# Patient Record
Sex: FEMALE | URBAN - METROPOLITAN AREA
[De-identification: names, ages, dates, MRNs, and addresses within clinical notes are randomized per-mention and may not be internally consistent; named-entity substitution may affect disease eponyms.]

---

## 2021-12-15 ENCOUNTER — APPOINTMENT (RX ONLY)
Dept: URBAN - METROPOLITAN AREA CLINIC 374 | Facility: CLINIC | Age: 46
Setting detail: DERMATOLOGY
End: 2021-12-15

## 2021-12-15 DIAGNOSIS — L81.1 CHLOASMA: ICD-10-CM | Status: WORSENING

## 2021-12-15 DIAGNOSIS — Z71.89 OTHER SPECIFIED COUNSELING: ICD-10-CM

## 2021-12-15 DIAGNOSIS — L81.4 OTHER MELANIN HYPERPIGMENTATION: ICD-10-CM

## 2021-12-15 PROCEDURE — ? PRESCRIPTION MEDICATION MANAGEMENT

## 2021-12-15 PROCEDURE — ? DIAGNOSIS COMMENT

## 2021-12-15 PROCEDURE — ? ADDITIONAL NOTES

## 2021-12-15 PROCEDURE — ? COUNSELING

## 2021-12-15 PROCEDURE — ? PHOTO-DOCUMENTATION

## 2021-12-15 PROCEDURE — ? RECOMMENDATIONS

## 2021-12-15 PROCEDURE — ? IN-HOUSE DISPENSING PHARMACY

## 2021-12-15 PROCEDURE — ? SUNSCREEN RECOMMENDATIONS

## 2021-12-15 PROCEDURE — 99203 OFFICE O/P NEW LOW 30 MIN: CPT

## 2021-12-15 ASSESSMENT — LOCATION SIMPLE DESCRIPTION DERM
LOCATION SIMPLE: RIGHT CHEEK
LOCATION SIMPLE: LEFT LIP
LOCATION SIMPLE: RIGHT LIP
LOCATION SIMPLE: LEFT CHEEK

## 2021-12-15 ASSESSMENT — LOCATION ZONE DERM
LOCATION ZONE: FACE
LOCATION ZONE: LIP

## 2021-12-15 ASSESSMENT — LOCATION DETAILED DESCRIPTION DERM
LOCATION DETAILED: RIGHT INFERIOR CENTRAL MALAR CHEEK
LOCATION DETAILED: LEFT INFERIOR CENTRAL MALAR CHEEK
LOCATION DETAILED: RIGHT UPPER CUTANEOUS LIP
LOCATION DETAILED: LEFT UPPER CUTANEOUS LIP

## 2021-12-15 NOTE — PROCEDURE: PRESCRIPTION MEDICATION MANAGEMENT
Detail Level: Zone
Render In Strict Bullet Format?: No
Initiate Treatment: Tretinoin 0.025% topical cream: Apply thin layer to face QHS
Initiate Treatment: Noni Tretinoin 0.025% topical cream: Apply thin layer to face QHS

## 2021-12-15 NOTE — PROCEDURE: ADDITIONAL NOTES
Additional Notes: Patient consent was obtained to proceed with the visit and recommended plan of care after discussion of all risks and benefits, including the risks of COVID-19 exposure.
Detail Level: Zone
Render Risk Assessment In Note?: yes

## 2021-12-15 NOTE — PROCEDURE: IN-HOUSE DISPENSING PHARMACY
Product 49 Unit Type: mg
Product 17 Unit Type: kit(s)
Name Of Product 4: Ultra Gentle Chemical Free SPF 30
Product 27 Refills: 0
Product 15 Refills: 3
Product 15 Amount/Unit (Numbers Only): 1
Product 8 Unit Type: bottle(s)
Product 13 Application Directions: Use to wash hair qday
Product 11 Price/Unit (In Dollars): 105
Product 4 Application Directions: apply thin layer to face qam and every 2 hours during the day
Name Of Product 9: 10/2 Therapeutic Spray
Product 2 Price/Unit (In Dollars): 46
Product 2 Refills: 2
Product 16 Price/Unit (In Dollars): 45
Product 9 Application Directions: spray thin layer on back qday
Name Of Product 14: Hyaluronic acid eye serum
Product 7 Price/Unit (In Dollars): 36.75
Name Of Product 5: R Essentials KP Kit
Product 7 Application Directions: apply thin layer to face qam
Name Of Product 12: 10/2 Therapeutic pads
Product 5 Price/Unit (In Dollars): 55
Product 16 Application Directions: apply scattered drops to scalp qam
Name Of Product 3: Noni tretinoin 0.05% topical cream
Product 7 Unit Type: tube(s)
Product 12 Application Directions: wipe face qam
Name Of Product 17: Brightening pads
Product 10 Price/Unit (In Dollars): 85
Product 1 Application Directions: apply thin layer to face qhs
Product 1 Unit Type: grams
Name Of Product 8: Biocorneum scar gel
Product 12 Unit Type: jar(s)
Product 15 Price/Unit (In Dollars): 39
Product 8 Application Directions: apply thin layer to scar BID
Name Of Product 13: DensiFi Shampoo and Conditioner
Product 6 Price/Unit (In Dollars): 30
Product 17 Application Directions: wipe aa of face qday
Detail Level: Zone
Product 13 Price/Unit (In Dollars): 56
Send Charges To Patient Encounter: Yes
Product 6 Application Directions: wipe face qam after washing
Name Of Product 11: retinol eye cream
Product 15 Application Directions: Wash aa of body qday in shower
Name Of Product 2: proscriptix pills
Product 11 Application Directions: apply thin layer to periorbital region qhs
Name Of Product 16: X.Cyte stimulating hair serum
Name Of Product 7: R Essentials sunscreen SPF50
Product 2 Application Directions: take 1 tab po TID
Product 14 Humphries/Unit (In Dollars): 51
Name Of Product 1: Noni tretinoin 0.025% topical cream
Product 1 Price/Unit (In Dollars): 84
Product 14 Application Directions: apply thin layer to under eye area qday
Product 5 Application Directions: wash face and neck qday with cleanser then apply thin layer of lotion
Name Of Product 10: Proscriptix shampoo, conditioner and pills
Product 3 Price/Unit (In Dollars): 70
Product 1 Amount/Unit (Numbers Only): 20
Product 17 Price/Unit (In Dollars): 78
Product 10 Application Directions: Wash hair with shampoo and conditioner each wash; take 1 pill po TID
Name Of Product 15: AHA 10% cleanser
Product 8 Price/Unit (In Dollars): 29.95
Name Of Product 6: 5/2 Therapeutic pads

## 2021-12-15 NOTE — PROCEDURE: RECOMMENDATIONS
Render Risk Assessment In Note?: no
Recommendations (Free Text): consult with Miguelina MANLEY
Detail Level: Simple
Recommendation Preamble: The following recommendations were made during the visit:

## 2021-12-28 ENCOUNTER — APPOINTMENT (RX ONLY)
Dept: URBAN - METROPOLITAN AREA CLINIC 374 | Facility: CLINIC | Age: 46
Setting detail: DERMATOLOGY
End: 2021-12-28

## 2021-12-28 DIAGNOSIS — Z41.9 ENCOUNTER FOR PROCEDURE FOR PURPOSES OTHER THAN REMEDYING HEALTH STATE, UNSPECIFIED: ICD-10-CM

## 2021-12-28 PROCEDURE — ? VI PEEL

## 2021-12-28 NOTE — PROCEDURE: VI PEEL
Price (Use Numbers Only, No Special Characters Or $): 782 Price (Use Numbers Only, No Special Characters Or $): 195

## 2022-01-27 ENCOUNTER — APPOINTMENT (RX ONLY)
Dept: URBAN - METROPOLITAN AREA CLINIC 374 | Facility: CLINIC | Age: 47
Setting detail: DERMATOLOGY
End: 2022-01-27

## 2022-01-27 DIAGNOSIS — Z41.9 ENCOUNTER FOR PROCEDURE FOR PURPOSES OTHER THAN REMEDYING HEALTH STATE, UNSPECIFIED: ICD-10-CM

## 2022-01-27 PROCEDURE — ? VI PEEL

## 2022-01-27 NOTE — PROCEDURE: VI PEEL
Price (Use Numbers Only, No Special Characters Or $): 699 Price (Use Numbers Only, No Special Characters Or $): 578

## 2022-02-24 ENCOUNTER — APPOINTMENT (RX ONLY)
Dept: URBAN - METROPOLITAN AREA CLINIC 374 | Facility: CLINIC | Age: 47
Setting detail: DERMATOLOGY
End: 2022-02-24

## 2022-02-24 DIAGNOSIS — Z41.9 ENCOUNTER FOR PROCEDURE FOR PURPOSES OTHER THAN REMEDYING HEALTH STATE, UNSPECIFIED: ICD-10-CM

## 2022-02-24 DIAGNOSIS — Z02.9 ENCOUNTER FOR ADMINISTRATIVE EXAMINATIONS, UNSPECIFIED: ICD-10-CM

## 2022-02-24 PROCEDURE — ? MICRONEEDLING

## 2022-02-24 NOTE — PROCEDURE: MICRONEEDLING
Depth In Mm: 0.1
Detail Level: Zone
Post-Care Instructions: After the procedure, take precautions agains sun exposure. Do not apply sunscreen for 12 hours after the procedure. Do not apply make-up for 12 hours after the procedure. Avoid alcohol based toners for 10-14 days. After 2-3 days patients can return to their regular skin regimen.
Depth In Mm: 1
Depth In Mm: 1.25
Consent: Written consent obtained, risks reviewed including but not limited to pain, scarring, infection and incomplete improvement.  Patient understands the procedure is cosmetic in nature and will require out of pocket payment.
Length Of Topical Anesthesia Application (Optional): 20 minutes
Price (Use Numbers Only, No Special Characters Or $): 379
Topical Anesthesia?: BLT cream (benzocaine 10%, lidocaine 4%, tetracaine 2%)
Depth In Mm: 0.5

## 2022-03-16 ENCOUNTER — APPOINTMENT (RX ONLY)
Dept: URBAN - METROPOLITAN AREA CLINIC 374 | Facility: CLINIC | Age: 47
Setting detail: DERMATOLOGY
End: 2022-03-16

## 2022-03-16 DIAGNOSIS — L81.4 OTHER MELANIN HYPERPIGMENTATION: ICD-10-CM | Status: STABLE

## 2022-03-16 DIAGNOSIS — L81.1 CHLOASMA: ICD-10-CM | Status: IMPROVED

## 2022-03-16 PROCEDURE — ? DEFER

## 2022-03-16 PROCEDURE — ? RECOMMENDATIONS

## 2022-03-16 PROCEDURE — ? PRESCRIPTION MEDICATION MANAGEMENT

## 2022-03-16 PROCEDURE — ? ADDITIONAL NOTES

## 2022-03-16 PROCEDURE — 99213 OFFICE O/P EST LOW 20 MIN: CPT

## 2022-03-16 ASSESSMENT — LOCATION ZONE DERM
LOCATION ZONE: FACE
LOCATION ZONE: LIP

## 2022-03-16 ASSESSMENT — LOCATION SIMPLE DESCRIPTION DERM
LOCATION SIMPLE: LEFT CHEEK
LOCATION SIMPLE: RIGHT CHEEK
LOCATION SIMPLE: RIGHT LIP

## 2022-03-16 ASSESSMENT — LOCATION DETAILED DESCRIPTION DERM
LOCATION DETAILED: RIGHT UPPER CUTANEOUS LIP
LOCATION DETAILED: LEFT INFERIOR CENTRAL MALAR CHEEK
LOCATION DETAILED: RIGHT INFERIOR CENTRAL MALAR CHEEK

## 2022-03-16 NOTE — PROCEDURE: RECOMMENDATIONS
Recommendations (Free Text): Continue visits with Miguelina [facility] .
Render Risk Assessment In Note?: no
Detail Level: Zone
Recommendation Preamble: The following recommendations were made during the visit:

## 2022-03-16 NOTE — PROCEDURE: DEFER
Procedure To Be Performed At Next Visit: Cryotherapy
Detail Level: Zone
Instructions (Optional): Continue treatments with Miguelina [facility] .
Introduction Text (Please End With A Colon): The following procedure was deferred:

## 2022-03-16 NOTE — PROCEDURE: PRESCRIPTION MEDICATION MANAGEMENT
Detail Level: Zone
Render In Strict Bullet Format?: No
Discontinue Regimen: Tretinoin
Discontinue Regimen: Noni Tretinoin 0.025% topical cream: Apply thin layer to face QHS

## 2022-03-24 ENCOUNTER — APPOINTMENT (RX ONLY)
Dept: URBAN - METROPOLITAN AREA CLINIC 374 | Facility: CLINIC | Age: 47
Setting detail: DERMATOLOGY
End: 2022-03-24

## 2022-03-24 DIAGNOSIS — Z41.9 ENCOUNTER FOR PROCEDURE FOR PURPOSES OTHER THAN REMEDYING HEALTH STATE, UNSPECIFIED: ICD-10-CM

## 2022-03-24 PROCEDURE — ? VENUS VERSA IPL

## 2022-03-24 ASSESSMENT — LOCATION DETAILED DESCRIPTION DERM
LOCATION DETAILED: RIGHT INFERIOR CENTRAL MALAR CHEEK
LOCATION DETAILED: RIGHT CHIN
LOCATION DETAILED: RIGHT INFERIOR MEDIAL FOREHEAD
LOCATION DETAILED: LEFT INFERIOR CENTRAL MALAR CHEEK

## 2022-03-24 ASSESSMENT — LOCATION SIMPLE DESCRIPTION DERM
LOCATION SIMPLE: CHIN
LOCATION SIMPLE: RIGHT CHEEK
LOCATION SIMPLE: RIGHT FOREHEAD
LOCATION SIMPLE: LEFT CHEEK

## 2022-03-24 ASSESSMENT — LOCATION ZONE DERM: LOCATION ZONE: FACE

## 2022-03-24 NOTE — PROCEDURE: VENUS VERSA IPL
Treated Area: large area
Applicator: [facility]
Anesthesia Volume In Cc: 0
Fluence: 13
Number Of Passes: 2
Cooling: 60
Frequency: 2 Hz
Price (Use Numbers Only, No Special Characters Or $): 450
Fluence Units: J/cm2
Detail Level: Zone
Consent: Written consent obtained, risks reviewed including but not limited to crusting, scabbing, blistering, scarring, darker or lighter pigmentary change, bruising, and/or incomplete response.
Pulse Duration (In Milliseconds): 15
Treatment Number: 1
Anesthesia Type: 1% lidocaine with epinephrine
Skin Type (Optional): II
Post-Care Instructions: I reviewed with the patient in detail post-care instructions. Patient should stay away from the sun and wear sun protection until treated areas are fully healed.
Pulse Mode: single

## 2022-04-28 ENCOUNTER — APPOINTMENT (RX ONLY)
Dept: URBAN - METROPOLITAN AREA CLINIC 374 | Facility: CLINIC | Age: 47
Setting detail: DERMATOLOGY
End: 2022-04-28

## 2022-04-28 DIAGNOSIS — Z41.9 ENCOUNTER FOR PROCEDURE FOR PURPOSES OTHER THAN REMEDYING HEALTH STATE, UNSPECIFIED: ICD-10-CM

## 2022-04-28 PROCEDURE — ? VENUS VERSA IPL

## 2022-04-28 ASSESSMENT — LOCATION SIMPLE DESCRIPTION DERM
LOCATION SIMPLE: LEFT CHEEK
LOCATION SIMPLE: CHIN
LOCATION SIMPLE: RIGHT FOREHEAD
LOCATION SIMPLE: RIGHT CHEEK

## 2022-04-28 ASSESSMENT — LOCATION ZONE DERM: LOCATION ZONE: FACE

## 2022-04-28 ASSESSMENT — LOCATION DETAILED DESCRIPTION DERM
LOCATION DETAILED: RIGHT INFERIOR CENTRAL MALAR CHEEK
LOCATION DETAILED: LEFT CHIN
LOCATION DETAILED: RIGHT INFERIOR MEDIAL FOREHEAD
LOCATION DETAILED: LEFT INFERIOR CENTRAL MALAR CHEEK

## 2022-04-28 NOTE — PROCEDURE: VENUS VERSA IPL
External Cooling Fan Speed: 0
Pulse Mode: single
Price (Use Numbers Only, No Special Characters Or $): 450
Consent: Written consent obtained, risks reviewed including but not limited to crusting, scabbing, blistering, scarring, darker or lighter pigmentary change, bruising, and/or incomplete response.
Fluence: 18
Applicator: [facility]
Treatment Number: 1
Skin Type (Optional): II
Anesthesia Type: 1% lidocaine with epinephrine
Fluence Units: J/cm2
Post-Care Instructions: I reviewed with the patient in detail post-care instructions. Patient should stay away from the sun and wear sun protection until treated areas are fully healed.
Frequency: 2 Hz
Number Of Passes: 2
Coolin
Detail Level: Zone
Treated Area: medium area
Pulse Duration (In Milliseconds): 15

## 2022-05-26 ENCOUNTER — APPOINTMENT (RX ONLY)
Dept: URBAN - METROPOLITAN AREA CLINIC 374 | Facility: CLINIC | Age: 47
Setting detail: DERMATOLOGY
End: 2022-05-26

## 2022-05-26 DIAGNOSIS — Z41.9 ENCOUNTER FOR PROCEDURE FOR PURPOSES OTHER THAN REMEDYING HEALTH STATE, UNSPECIFIED: ICD-10-CM

## 2022-05-26 PROCEDURE — ? VI PEEL

## 2022-05-26 ASSESSMENT — LOCATION DETAILED DESCRIPTION DERM
LOCATION DETAILED: RIGHT INFERIOR CENTRAL MALAR CHEEK
LOCATION DETAILED: LEFT INFERIOR CENTRAL MALAR CHEEK
LOCATION DETAILED: RIGHT INFERIOR MEDIAL FOREHEAD
LOCATION DETAILED: RIGHT CHIN

## 2022-05-26 ASSESSMENT — LOCATION ZONE DERM: LOCATION ZONE: FACE

## 2022-05-26 NOTE — PROCEDURE: VI PEEL
Price (Use Numbers Only, No Special Characters Or $): 399
Treatment Number: 3
Prep: The treated area was degreased with pre-peel cleanser, and vaseline was applied for protection of mucous membranes.
Post Peel Care: After the procedure, the patient was instructed not to wash the treated area for 6-8 hours or manually remove dead skin when the peeling process starts. Patient may use OTC hydrocortisone cream for itching.  Patient instructed to use the provided Retin-A wipes on the treated area on the 1st and 2nd nights.
Detail Level: Zone
Comments: Saw great results from laser.
Consent: Prior to the procedure, written consent was obtained and risks were reviewed, including but not limited to: redness, peeling, blistering, pigmentary change, scarring, infection, and pain. Patient is aware multiple treatments may be necessary to achieve the desired outcome.
Post-Care Instructions: I reviewed with the patient in detail post-care instructions. Patient should avoid sun exposure and wear sun protection.
Chemical Peel: VI Peel Precision Plus

## 2022-06-29 ENCOUNTER — APPOINTMENT (RX ONLY)
Dept: URBAN - METROPOLITAN AREA CLINIC 374 | Facility: CLINIC | Age: 47
Setting detail: DERMATOLOGY
End: 2022-06-29

## 2022-06-29 DIAGNOSIS — Z41.9 ENCOUNTER FOR PROCEDURE FOR PURPOSES OTHER THAN REMEDYING HEALTH STATE, UNSPECIFIED: ICD-10-CM

## 2022-06-29 PROCEDURE — ? MICRONEEDLING

## 2022-06-29 ASSESSMENT — LOCATION DETAILED DESCRIPTION DERM
LOCATION DETAILED: RIGHT INFERIOR MEDIAL FOREHEAD
LOCATION DETAILED: LEFT INFERIOR CENTRAL MALAR CHEEK
LOCATION DETAILED: RIGHT CENTRAL MALAR CHEEK
LOCATION DETAILED: RIGHT CHIN

## 2022-06-29 ASSESSMENT — LOCATION SIMPLE DESCRIPTION DERM
LOCATION SIMPLE: LEFT CHEEK
LOCATION SIMPLE: RIGHT FOREHEAD
LOCATION SIMPLE: RIGHT CHEEK
LOCATION SIMPLE: CHIN

## 2022-06-29 ASSESSMENT — LOCATION ZONE DERM: LOCATION ZONE: FACE

## 2022-06-29 NOTE — PROCEDURE: MICRONEEDLING
Post-Care Instructions: After the procedure, take precautions agains sun exposure. Do not apply sunscreen for 12 hours after the procedure. Do not apply make-up for 12 hours after the procedure. Avoid alcohol based toners for 10-14 days. After 2-3 days patients can return to their regular skin regimen.
Depth In Mm: 1
Infusions (Optional): hyaluronic acid
Price (Use Numbers Only, No Special Characters Or $): 379
Depth In Mm: 0.1
Location #1: cheeks/ ocular
Length Of Topical Anesthesia Application (Optional): 15 minutes
Depth In Mm: 0.5
Consent: Written consent obtained, risks reviewed including but not limited to pain, scarring, infection and incomplete improvement.  Patient understands the procedure is cosmetic in nature and will require out of pocket payment.
Treatment Number (Optional): 0
Location #2: forehead/ chin
Depth In Mm: 2
Detail Level: Zone
Topical Anesthesia?: BLT cream (benzocaine 10%, lidocaine 4%, tetracaine 2%)

## 2022-08-10 ENCOUNTER — APPOINTMENT (RX ONLY)
Dept: URBAN - METROPOLITAN AREA CLINIC 374 | Facility: CLINIC | Age: 47
Setting detail: DERMATOLOGY
End: 2022-08-10

## 2022-08-10 DIAGNOSIS — Z41.9 ENCOUNTER FOR PROCEDURE FOR PURPOSES OTHER THAN REMEDYING HEALTH STATE, UNSPECIFIED: ICD-10-CM

## 2022-08-10 PROCEDURE — ? DERMAPLANE

## 2022-08-10 PROCEDURE — ? AHA PEEL

## 2022-08-10 ASSESSMENT — LOCATION SIMPLE DESCRIPTION DERM
LOCATION SIMPLE: CHIN
LOCATION SIMPLE: RIGHT CHEEK
LOCATION SIMPLE: LEFT CHEEK
LOCATION SIMPLE: RIGHT FOREHEAD

## 2022-08-10 ASSESSMENT — LOCATION DETAILED DESCRIPTION DERM
LOCATION DETAILED: LEFT INFERIOR CENTRAL MALAR CHEEK
LOCATION DETAILED: RIGHT CHIN
LOCATION DETAILED: RIGHT INFERIOR MEDIAL FOREHEAD
LOCATION DETAILED: RIGHT MENTAL CREASE
LOCATION DETAILED: RIGHT INFERIOR CENTRAL MALAR CHEEK
LOCATION DETAILED: RIGHT INFERIOR FOREHEAD

## 2022-08-10 ASSESSMENT — LOCATION ZONE DERM: LOCATION ZONE: FACE

## 2022-08-10 NOTE — PROCEDURE: DERMAPLANE
Treatment Area Prep: alcohol
Price (Use Numbers Only, No Special Characters Or $): 225.00
Post-Care Instructions: I reviewed with the patient in detail post-care instructions.
Detail Level: Zone
Pre-Procedure Text: The patient was placed in a recumbant position on the procedure table.
Treatment Areas: face and neck
Blade: Bryan scalpel
Post-Procedure Instructions: Following the dermaplane procedure, Oxymist treatment was applied to the treatment areas. Moisturizer and SPF was applied.

## 2022-08-10 NOTE — PROCEDURE: AHA PEEL
Consent: Prior to the procedure, written consent was obtained and risks were reviewed, including but not limited to: redness, peeling, blistering, pigmentary change, scarring, infection, and pain.
Chemical Peel: AHA 25% Peel
Post-Care Instructions: I reviewed with the patient in detail post-care instructions. Patient should avoid sun exposure and wear sun protection.
Prep: The treated area was degreased with pre-peel cleanser, and vaseline was applied for protection of mucous membranes.
Detail Level: Zone
Price (Use Numbers Only, No Special Characters Or $): 175
Treatment Number: 0
Comments: Glycolic 30% for 3 mins

## 2023-09-28 ENCOUNTER — APPOINTMENT (RX ONLY)
Dept: URBAN - METROPOLITAN AREA CLINIC 374 | Facility: CLINIC | Age: 48
Setting detail: DERMATOLOGY
End: 2023-09-28

## 2023-09-28 DIAGNOSIS — L82.1 OTHER SEBORRHEIC KERATOSIS: ICD-10-CM

## 2023-09-28 DIAGNOSIS — D22 MELANOCYTIC NEVI: ICD-10-CM

## 2023-09-28 DIAGNOSIS — D18.0 HEMANGIOMA: ICD-10-CM

## 2023-09-28 DIAGNOSIS — Z71.89 OTHER SPECIFIED COUNSELING: ICD-10-CM

## 2023-09-28 DIAGNOSIS — L81.1 CHLOASMA: ICD-10-CM

## 2023-09-28 DIAGNOSIS — L81.4 OTHER MELANIN HYPERPIGMENTATION: ICD-10-CM

## 2023-09-28 PROBLEM — D18.01 HEMANGIOMA OF SKIN AND SUBCUTANEOUS TISSUE: Status: ACTIVE | Noted: 2023-09-28

## 2023-09-28 PROBLEM — D22.61 MELANOCYTIC NEVI OF RIGHT UPPER LIMB, INCLUDING SHOULDER: Status: ACTIVE | Noted: 2023-09-28

## 2023-09-28 PROBLEM — D22.5 MELANOCYTIC NEVI OF TRUNK: Status: ACTIVE | Noted: 2023-09-28

## 2023-09-28 PROBLEM — D22.72 MELANOCYTIC NEVI OF LEFT LOWER LIMB, INCLUDING HIP: Status: ACTIVE | Noted: 2023-09-28

## 2023-09-28 PROBLEM — D22.71 MELANOCYTIC NEVI OF RIGHT LOWER LIMB, INCLUDING HIP: Status: ACTIVE | Noted: 2023-09-28

## 2023-09-28 PROBLEM — D22.62 MELANOCYTIC NEVI OF LEFT UPPER LIMB, INCLUDING SHOULDER: Status: ACTIVE | Noted: 2023-09-28

## 2023-09-28 PROCEDURE — ? PRESCRIPTION

## 2023-09-28 PROCEDURE — ? COUNSELING

## 2023-09-28 PROCEDURE — ? SUNSCREEN RECOMMENDATIONS

## 2023-09-28 PROCEDURE — ? FULL BODY SKIN EXAM

## 2023-09-28 PROCEDURE — 99213 OFFICE O/P EST LOW 20 MIN: CPT

## 2023-09-28 PROCEDURE — ? PRESCRIPTION MEDICATION MANAGEMENT

## 2023-09-28 RX ORDER — H-QUINONE/TRETINOIN/HYDROCORT 4 %-0.025%
1 EMULSION (GRAM) TOPICAL BID
Qty: 30 | Refills: 2 | Status: ERX | COMMUNITY
Start: 2023-09-28

## 2023-09-28 RX ADMIN — Medication 1: at 00:00

## 2023-09-28 ASSESSMENT — LOCATION DETAILED DESCRIPTION DERM
LOCATION DETAILED: RIGHT ANTERIOR DISTAL UPPER ARM
LOCATION DETAILED: RIGHT UPPER CUTANEOUS LIP
LOCATION DETAILED: LEFT ANTERIOR DISTAL THIGH
LOCATION DETAILED: LEFT SUPERIOR MEDIAL UPPER BACK
LOCATION DETAILED: LEFT UPPER CUTANEOUS LIP
LOCATION DETAILED: RIGHT SUPERIOR MEDIAL UPPER BACK
LOCATION DETAILED: LEFT ANTERIOR PROXIMAL THIGH
LOCATION DETAILED: RIGHT ANTERIOR DISTAL THIGH
LOCATION DETAILED: SUPERIOR LUMBAR SPINE
LOCATION DETAILED: RIGHT ANTERIOR PROXIMAL UPPER ARM
LOCATION DETAILED: EPIGASTRIC SKIN
LOCATION DETAILED: RIGHT POSTERIOR SHOULDER
LOCATION DETAILED: INFERIOR THORACIC SPINE
LOCATION DETAILED: LEFT POSTERIOR SHOULDER
LOCATION DETAILED: LEFT ANTERIOR PROXIMAL UPPER ARM
LOCATION DETAILED: RIGHT MEDIAL SUPERIOR CHEST

## 2023-09-28 ASSESSMENT — LOCATION SIMPLE DESCRIPTION DERM
LOCATION SIMPLE: LEFT UPPER ARM
LOCATION SIMPLE: LEFT THIGH
LOCATION SIMPLE: LEFT UPPER BACK
LOCATION SIMPLE: RIGHT SHOULDER
LOCATION SIMPLE: ABDOMEN
LOCATION SIMPLE: UPPER BACK
LOCATION SIMPLE: RIGHT UPPER BACK
LOCATION SIMPLE: LEFT LIP
LOCATION SIMPLE: RIGHT THIGH
LOCATION SIMPLE: LOWER BACK
LOCATION SIMPLE: CHEST
LOCATION SIMPLE: LEFT SHOULDER
LOCATION SIMPLE: RIGHT UPPER ARM
LOCATION SIMPLE: RIGHT LIP

## 2023-09-28 ASSESSMENT — LOCATION ZONE DERM
LOCATION ZONE: ARM
LOCATION ZONE: TRUNK
LOCATION ZONE: LIP
LOCATION ZONE: LEG

## 2023-09-28 NOTE — PROCEDURE: PRESCRIPTION MEDICATION MANAGEMENT
Detail Level: Zone
Render In Strict Bullet Format?: No
Discontinue Regimen: Tretinoin 0.025% topical cream: Apply thin layer to face QHS
Initiate Treatment: Katarya 4 %-0.025 %-0.5 % topical emulsion BID: Apply a thin layer only to AA of the face BID

## 2024-11-21 ENCOUNTER — OFFICE VISIT (OUTPATIENT)
Dept: OBSTETRICS AND GYNECOLOGY | Facility: CLINIC | Age: 49
End: 2024-11-21
Payer: COMMERCIAL

## 2024-11-21 VITALS
HEIGHT: 61 IN | WEIGHT: 185 LBS | SYSTOLIC BLOOD PRESSURE: 132 MMHG | BODY MASS INDEX: 34.93 KG/M2 | DIASTOLIC BLOOD PRESSURE: 84 MMHG

## 2024-11-21 DIAGNOSIS — N91.2 AMENORRHEA: Primary | ICD-10-CM

## 2024-11-21 DIAGNOSIS — Z87.59 HISTORY OF PRE-ECLAMPSIA: ICD-10-CM

## 2024-11-21 DIAGNOSIS — O09.519 AMA (ADVANCED MATERNAL AGE) PRIMIGRAVIDA 35+, UNSPECIFIED TRIMESTER: ICD-10-CM

## 2024-11-21 PROBLEM — O09.529 AMA (ADVANCED MATERNAL AGE) MULTIGRAVIDA 35+: Status: ACTIVE | Noted: 2024-11-21

## 2024-11-21 PROBLEM — J45.909 ASTHMA: Status: ACTIVE | Noted: 2024-11-21

## 2024-11-21 PROBLEM — A60.9 HSV (HERPES SIMPLEX VIRUS) ANOGENITAL INFECTION: Status: ACTIVE | Noted: 2024-11-21

## 2024-11-21 PROBLEM — Z64.1 MULTIGRAVIDA: Status: ACTIVE | Noted: 2024-11-21

## 2024-11-21 PROCEDURE — 36415 COLL VENOUS BLD VENIPUNCTURE: CPT | Performed by: OBSTETRICS & GYNECOLOGY

## 2024-11-21 PROCEDURE — 99204 OFFICE O/P NEW MOD 45 MIN: CPT | Performed by: OBSTETRICS & GYNECOLOGY

## 2024-11-21 RX ORDER — VALACYCLOVIR HYDROCHLORIDE 500 MG/1
500 TABLET, FILM COATED ORAL DAILY
COMMUNITY

## 2024-11-21 NOTE — PROGRESS NOTES
"2024  Roxann Silva is a 49 y.o. female who presents for evaluation of amenorrhea. This IVF pregnancy with DONOR egg with Cain Ramos.      Patient's last menstrual period was 2024 (approximate).  The following portions of the patient's history were reviewed and updated as appropriate: She  has a past medical history of Asthma, Herpes, and Migraines..  Visit Vitals  /84 (BP Location: Right upper arm, Patient Position: Sitting)   Ht 1.549 m (5' 1\")   Wt 83.9 kg (185 lb)   LMP 2024 (Approximate)   BMI 34.96 kg/m²     Pregnancy Test: Home urine pregnancy test reported positive by patient    General:   alert, appears stated age, and cooperative   Heart: regular rate and rhythm, S1, S2 normal, no murmur, click, rub or gallop   Lungs: clear to auscultation bilaterally   Abdomen: soft, non-tender, without masses or organomegaly   Vulva: normal   Vagina: normal mucosa   Cervix: no lesions   Uterus: size consistent with 11 weeks   Adnexa: normal adnexa       Diagnoses and all orders for this visit:    Amenorrhea  -     ABO/RH  -     Antibody Screen  -     CBC  -     Hemoglobin A1c  -     Hepatitis B surface antigen  -     Hepatitis C antibody  -     HIV 1,2 AB P24 AG  -     Rubella antibody, IgG  -     Syphilis Antibodies  -     Urine culture  -     Varicella zoster antibody, IgG  -     ALT  -     AST  -     Creatinine, serum  -     Protein / creatinine ratio, urine  -     Uric acid  -     [id number] PLUS CORE+SCA [facility]  -     Chlamydia/GC RNA:ThinPrep,Urine,Swab    [place] product of in vitro fertilization (IVF) pregnancy  -     ABO/RH  -     Antibody Screen  -     CBC  -     Hemoglobin A1c  -     Hepatitis B surface antigen  -     Hepatitis C antibody  -     HIV 1,2 AB P24 AG  -     Rubella antibody, IgG  -     Syphilis Antibodies  -     Urine culture  -     Varicella zoster antibody, IgG  -     ALT  -     AST  -     Creatinine, serum  -     Protein / creatinine ratio, urine  -     Uric acid  -    "  [id number] PLUS CORE+SCA [facility]  -     Chlamydia/GC RNA:ThinPrep,Urine,Swab    History of pre-eclampsia  -     ABO/RH  -     Antibody Screen  -     CBC  -     Hemoglobin A1c  -     Hepatitis B surface antigen  -     Hepatitis C antibody  -     HIV 1,2 AB P24 AG  -     Rubella antibody, IgG  -     Syphilis Antibodies  -     Urine culture  -     Varicella zoster antibody, IgG  -     ALT  -     AST  -     Creatinine, serum  -     Protein / creatinine ratio, urine  -     Uric acid  -     [id number] PLUS CORE+SCA [facility]  -     Chlamydia/GC RNA:ThinPrep,Urine,Swab    AMA (advanced maternal age) primigravida 35+, unspecified trimester  -     ABO/RH  -     Antibody Screen  -     CBC  -     Hemoglobin A1c  -     Hepatitis B surface antigen  -     Hepatitis C antibody  -     HIV 1,2 AB P24 AG  -     Rubella antibody, IgG  -     Syphilis Antibodies  -     Urine culture  -     Varicella zoster antibody, IgG  -     ALT  -     AST  -     Creatinine, serum  -     Protein / creatinine ratio, urine  -     Uric acid  -     [id number] PLUS CORE+SCA [facility]  -     Chlamydia/GC RNA:ThinPrep,Urine,Swab    .  IVF pregnancy - Donor egg with partner's sperm. Need [place] [facility] records. BSUS consistent with [place], sIUP, +FH 150s bpm. Reviewed her history, asthma, possible cHTN (had PEC w SF with both prior pregnancies), obesity, AMA, and HSV currently on valtrex. Advise starting baby ASA daily. Reviewed PTC and scheduling NT scan at 11-14 weeks. Unsure if had genetics on embryo? Will do NIPT today. Has had Carrier Screening in past need records. PNL drawn today with baseline gHTN labs. Welcome packet given. Aware of males/moonlighters/midwives. Dos and Donts discussed.   No follow-ups on file.  Iveth Araujo, DO

## 2024-11-22 LAB
ABO GROUP BLD: NORMAL
ALT SERPL-CCNC: 13 IU/L (ref 0–32)
AST SERPL-CCNC: 17 IU/L (ref 0–40)
BACTERIA UR CULT: NO GROWTH
BACTERIA UR CULT: NORMAL
BLD GP AB SCN SERPL QL: NEGATIVE
CREAT SERPL-MCNC: 0.59 MG/DL (ref 0.57–1)
CREAT UR-MCNC: 5.3 MG/DL
EGFRCR SERPLBLD CKD-EPI 2021: 111 ML/MIN/1.73
ERYTHROCYTE [DISTWIDTH] IN BLOOD BY AUTOMATED COUNT: 12.4 % (ref 11.7–15.4)
HBA1C MFR BLD: 5.4 % (ref 4.8–5.6)
HBV SURFACE AG SERPL QL IA: NEGATIVE
HCT VFR BLD AUTO: 44.5 % (ref 34–46.6)
HCV IGG SERPL QL IA: NON REACTIVE
HGB BLD-MCNC: 14.9 G/DL (ref 11.1–15.9)
HIV 1+2 AB+HIV1 P24 AG SERPL QL IA: NON REACTIVE
MCH RBC QN AUTO: 30.8 PG (ref 26.6–33)
MCHC RBC AUTO-ENTMCNC: 33.5 G/DL (ref 31.5–35.7)
MCV RBC AUTO: 92 FL (ref 79–97)
PLATELET # BLD AUTO: 304 X10E3/UL (ref 150–450)
PROT UR-MCNC: <4 MG/DL
PROT/CREAT UR: ABNORMAL MG/G CREAT (ref 0–200)
RBC # BLD AUTO: 4.84 X10E6/UL (ref 3.77–5.28)
RH BLD: POSITIVE
RUBV IGG SERPL IA-ACNC: 10.3 INDEX
TREPONEMA PALLIDUM IGG+IGM AB [PRESENCE] IN SERUM OR PLASMA BY IMMUNOASSAY: NON REACTIVE
URATE SERPL-MCNC: 2 MG/DL (ref 2.6–6.2)
VZV IGG SER QL IA: REACTIVE
WBC # BLD AUTO: 8.1 X10E3/UL (ref 3.4–10.8)

## 2024-11-23 LAB
C TRACH RRNA SPEC QL NAA+PROBE: NEGATIVE
N GONORRHOEA RRNA SPEC QL NAA+PROBE: NEGATIVE

## 2024-11-25 DIAGNOSIS — Z36.82 ENCOUNTER FOR (NT) NUCHAL TRANSLUCENCY SCAN: ICD-10-CM

## 2024-11-25 DIAGNOSIS — O09.519 AMA (ADVANCED MATERNAL AGE) PRIMIGRAVIDA 35+, UNSPECIFIED TRIMESTER: ICD-10-CM

## 2024-11-25 DIAGNOSIS — Z36.9 ANTENATAL SCREENING ENCOUNTER: Primary | ICD-10-CM

## 2024-11-27 LAB
CFDNA.FET/CFDNA.TOTAL SFR FETUS: NORMAL %
CITATION REF LAB TEST: NORMAL
FET 13+18+21+X+Y ANEUP PLAS.CFDNA: NEGATIVE
FET CHR 21 TS PLAS.CFDNA QL: NEGATIVE
FET MS X RISK WBC.DNA+CFDNA QL: NOT DETECTED
FET SEX PLAS.CFDNA DOSAGE CFDNA: NORMAL
FET TS 13 RISK PLAS.CFDNA QL: NEGATIVE
FET TS 18 RISK WBC.DNA+CFDNA QL: NEGATIVE
FET X + Y ANEUP RISK PLAS.CFDNA SEQ-IMP: NOT DETECTED
GA EST FROM CONCEPTION DATE: NORMAL D
LAB CORP GESTATIONAL AGE: YES
LAB CORP POSITIVE PREDICTIVE VALUE: NORMAL
LAB CORP TEST LIMITATIONS: NORMAL
LAB DIRECTOR NAME PROVIDER: NORMAL
LAB DIRECTOR NAME PROVIDER: NORMAL
LABCORP NEGATIVE PREDICTIVE VALUE: NORMAL
LABCORP PERFORMANCE CHARACTERISTICS: NORMAL
LABORATORY COMMENT REPORT: NORMAL
Lab: NORMAL
REF LAB TEST METHOD: NORMAL
TEST PERFORMANCE INFO SPEC: NORMAL

## 2024-12-02 ENCOUNTER — HOSPITAL ENCOUNTER (OUTPATIENT)
Dept: PERINATAL CARE | Facility: HOSPITAL | Age: 49
Discharge: HOME | End: 2024-12-02
Attending: OBSTETRICS & GYNECOLOGY
Payer: COMMERCIAL

## 2024-12-02 DIAGNOSIS — Z36.82 ENCOUNTER FOR (NT) NUCHAL TRANSLUCENCY SCAN: ICD-10-CM

## 2024-12-02 DIAGNOSIS — O09.521 SUPERVISION OF ELDERLY MULTIGRAVIDA IN FIRST TRIMESTER: Primary | ICD-10-CM

## 2024-12-02 DIAGNOSIS — Z3A.12 12 WEEKS GESTATION OF PREGNANCY: ICD-10-CM

## 2024-12-02 DIAGNOSIS — O09.811 SUPERVISION OF PREGNANCY RESULTING FROM ASSISTED REPRODUCTIVE TECHNOLOGY IN FIRST TRIMESTER: ICD-10-CM

## 2024-12-02 DIAGNOSIS — Z36.3 ANTENATAL SCREENING FOR MALFORMATION USING ULTRASONICS: ICD-10-CM

## 2024-12-02 PROCEDURE — 76813 OB US NUCHAL MEAS 1 GEST: CPT

## 2024-12-18 ENCOUNTER — ROUTINE PRENATAL (OUTPATIENT)
Dept: OBSTETRICS AND GYNECOLOGY | Facility: CLINIC | Age: 49
End: 2024-12-18
Payer: COMMERCIAL

## 2024-12-18 VITALS
DIASTOLIC BLOOD PRESSURE: 80 MMHG | HEIGHT: 61 IN | SYSTOLIC BLOOD PRESSURE: 138 MMHG | BODY MASS INDEX: 35.3 KG/M2 | WEIGHT: 187 LBS

## 2024-12-18 DIAGNOSIS — Z86.32 HISTORY OF GESTATIONAL DIABETES: ICD-10-CM

## 2024-12-18 DIAGNOSIS — O09.522 MULTIGRAVIDA OF ADVANCED MATERNAL AGE IN SECOND TRIMESTER: Primary | ICD-10-CM

## 2024-12-18 DIAGNOSIS — Z36.89 ENCOUNTER FOR OTHER SPECIFIED ANTENATAL SCREENING: ICD-10-CM

## 2024-12-18 DIAGNOSIS — Z87.59 HISTORY OF PRE-ECLAMPSIA: ICD-10-CM

## 2024-12-18 PROCEDURE — 0500F INITIAL PRENATAL CARE VISIT: CPT | Performed by: OBSTETRICS & GYNECOLOGY

## 2024-12-18 NOTE — PRENATAL NOTE
Pt doing well overall.  Initial 's/90's.  Repeat 138/80.  We had a nice conversation about the difference between chronic hypertension versus pregnancy-induced hypertension.  She had preeclampsia with both of her daughters.  Both times she was delivered at 36 weeks with induction for vaginal delivery.  After her second child she stayed on meds for quite a while.  She had sporadically elevated blood pressures at urgent care visits.   She is typically normal at her PCP.  She already did baseline labs for us that were normal.  She is on baby aspirin.  Will keep a close eye.  I am referring her to cardiology just to establish care as preeclampsia is a risk factor for lifelong cardiovascular disease in women.    History of GDM A1.  I recommend an early 1 hour glucose test.  I gave her the bag and instructions.  She can do in the next 2 weeks at our office.    Return to office in 4 weeks will do AFP at that time.    Ordered anatomy scan.

## 2025-01-10 ENCOUNTER — CLINICAL SUPPORT (OUTPATIENT)
Dept: OBSTETRICS AND GYNECOLOGY | Facility: CLINIC | Age: 50
End: 2025-01-10
Payer: COMMERCIAL

## 2025-01-10 DIAGNOSIS — Z36.9 ENCOUNTER FOR ANTENATAL SCREENING: Primary | ICD-10-CM

## 2025-01-10 PROCEDURE — 36415 COLL VENOUS BLD VENIPUNCTURE: CPT | Performed by: OBSTETRICS & GYNECOLOGY

## 2025-01-12 LAB — GLUCOSE 1H P 50 G GLC PO SERPL-MCNC: 121 MG/DL (ref 70–139)

## 2025-01-22 ENCOUNTER — ROUTINE PRENATAL (OUTPATIENT)
Dept: OBSTETRICS AND GYNECOLOGY | Facility: CLINIC | Age: 50
End: 2025-01-22
Payer: COMMERCIAL

## 2025-01-22 VITALS — BODY MASS INDEX: 34.96 KG/M2 | DIASTOLIC BLOOD PRESSURE: 78 MMHG | WEIGHT: 185 LBS | SYSTOLIC BLOOD PRESSURE: 126 MMHG

## 2025-01-22 DIAGNOSIS — Z86.32 HISTORY OF GESTATIONAL DIABETES: ICD-10-CM

## 2025-01-22 DIAGNOSIS — O09.90 HIGH RISK PREGNANCY, ANTEPARTUM: Primary | ICD-10-CM

## 2025-01-22 DIAGNOSIS — Z87.59 HISTORY OF PRE-ECLAMPSIA: ICD-10-CM

## 2025-01-22 DIAGNOSIS — O09.522 MULTIGRAVIDA OF ADVANCED MATERNAL AGE IN SECOND TRIMESTER: ICD-10-CM

## 2025-01-22 PROBLEM — O16.9 HYPERTENSION AFFECTING PREGNANCY: Status: ACTIVE | Noted: 2020-11-20

## 2025-01-22 PROBLEM — Z64.1 MULTIGRAVIDA: Status: RESOLVED | Noted: 2024-11-21 | Resolved: 2025-01-22

## 2025-01-22 PROCEDURE — 0502F SUBSEQUENT PRENATAL CARE: CPT | Performed by: OBSTETRICS & GYNECOLOGY

## 2025-01-22 NOTE — PROGRESS NOTES
ROUTINE OB VISIT     HPI     Roxann Silva is a 49 y.o. female being seen today for her obstetrical visit. She is at 19w6d gestation.   Patient reports no complaints. No LOF or vb. No ctx or pain  Fetal movement: feeling some bubbling/ flutters                     Review of Systems     ROS performed and negative unless noted in HPI          Objective            Visit Vitals  /78 (BP Location: Right upper arm, Patient Position: Sitting)   Wt 83.9 kg (185 lb)   LMP 2024 (Approximate)   BMI 34.96 kg/m²        FHT:     155 BPM     Uterine Size:     cwd                Assessment/ Plan     Problem List Items Addressed This Visit          Obstetric    AMA (advanced maternal age) multigravida 35+    High risk pregnancy, antepartum - Primary    Relevant Orders    Maternal Serum AFP       Other    History of gestational diabetes    Overview     Hx of GDMA1 and BMI=35.   []early 1hr GTT - gave drink          History of pre-eclampsia    Overview     IOL for both babies at 36 weeks for this. States has had some elevated mild range BPS after her deliveries, likely cHTN, will monitor and do baseline labs    Elevated BP in my office .  Normal baseline labs.  Referring to Cards to establish care.  I also suspect cHTN.  She describes having elevated BP after delivering her daughter for 3months, on meds.  After discontinuing meds would be elevated at urgent care but not at PCP.     On ASA 81mg.           product of in vitro fertilization (IVF) pregnancy    Overview     Donor egg this time, husbands sperm.   This is a boy.           Normal ealry 1hr gtt - will repeat at 26wks  Ensure taking baby asa - has continued  AFP today  Has anatomy US scheduled   [id number] or PRN  Reviewed normal fetal movement at this time.  Discussed warning signs/ reasons to call.               Kathy Boyd DO

## 2025-01-25 LAB
AFP INTERP SERPL-IMP: NORMAL
AFP INTERP SERPL-IMP: NORMAL
AFP MOM SERPL: 1.09
AFP SERPL-MCNC: 52.2 NG/ML
AGE AT DELIVERY: 48.5 YR
GA METHOD: NORMAL
GA: 19.9 WEEKS
IDDM PATIENT QL: NO
LAB CORP COMMENT:: NORMAL
LAB CORP RESULTS: NORMAL
MULTIPLE PREGNANCY: NO
NEURAL TUBE DEFECT RISK FETUS: 9231 %

## 2025-01-31 ENCOUNTER — HOSPITAL ENCOUNTER (OUTPATIENT)
Dept: PERINATAL CARE | Facility: HOSPITAL | Age: 50
Discharge: HOME | End: 2025-01-31
Attending: OBSTETRICS & GYNECOLOGY
Payer: COMMERCIAL

## 2025-01-31 DIAGNOSIS — O09.812 PREGNANCY RESULTING FROM IN VITRO FERTILIZATION, SECOND TRIMESTER: ICD-10-CM

## 2025-01-31 DIAGNOSIS — O09.522 MULTIGRAVIDA OF ADVANCED MATERNAL AGE IN SECOND TRIMESTER: Primary | ICD-10-CM

## 2025-01-31 DIAGNOSIS — Z36.3 ANTENATAL SCREENING FOR MALFORMATION USING ULTRASONICS: ICD-10-CM

## 2025-01-31 DIAGNOSIS — Z3A.21 21 WEEKS GESTATION OF PREGNANCY: ICD-10-CM

## 2025-01-31 PROCEDURE — 76811 OB US DETAILED SNGL FETUS: CPT

## 2025-02-21 ENCOUNTER — ROUTINE PRENATAL (OUTPATIENT)
Dept: OBSTETRICS AND GYNECOLOGY | Facility: CLINIC | Age: 50
End: 2025-02-21
Payer: COMMERCIAL

## 2025-02-21 VITALS
SYSTOLIC BLOOD PRESSURE: 128 MMHG | DIASTOLIC BLOOD PRESSURE: 84 MMHG | HEIGHT: 61 IN | WEIGHT: 192 LBS | BODY MASS INDEX: 36.25 KG/M2

## 2025-02-21 DIAGNOSIS — O09.90 HIGH RISK PREGNANCY, ANTEPARTUM: Primary | ICD-10-CM

## 2025-02-21 DIAGNOSIS — A60.9 HSV (HERPES SIMPLEX VIRUS) ANOGENITAL INFECTION: ICD-10-CM

## 2025-02-21 DIAGNOSIS — O09.522 MULTIGRAVIDA OF ADVANCED MATERNAL AGE IN SECOND TRIMESTER: ICD-10-CM

## 2025-02-21 DIAGNOSIS — O16.9 HYPERTENSION AFFECTING PREGNANCY, ANTEPARTUM: ICD-10-CM

## 2025-02-21 DIAGNOSIS — Z86.32 HISTORY OF GESTATIONAL DIABETES: ICD-10-CM

## 2025-02-21 DIAGNOSIS — Z87.59 HISTORY OF PRE-ECLAMPSIA: ICD-10-CM

## 2025-02-21 PROCEDURE — 0502F SUBSEQUENT PRENATAL CARE: CPT | Performed by: OBSTETRICS & GYNECOLOGY

## 2025-02-21 RX ORDER — ACETAMINOPHEN 500 MG
TABLET ORAL SEE ADMIN INSTRUCTIONS
COMMUNITY

## 2025-02-21 RX ORDER — FLUTICASONE FUROATE AND VILANTEROL 100; 25 UG/1; UG/1
POWDER RESPIRATORY (INHALATION)
COMMUNITY

## 2025-02-21 RX ORDER — ALBUTEROL SULFATE 90 UG/1
2 INHALANT RESPIRATORY (INHALATION) EVERY 4 HOURS PRN
COMMUNITY

## 2025-02-21 NOTE — PROGRESS NOTES
"ROUTINE OB VISIT     HPI     Roxann Silva is a 49 y.o. female being seen today for her obstetrical visit. She is at 24w1d gestation.     Patient reports no complaints. Fetal movement: normal.                    Review of Systems     ROS performed and negative unless noted in HPI          Objective            Visit Vitals  /84 (BP Location: Right upper arm, Patient Position: Sitting)   Ht 1.549 m (5' 1\")   Wt 87.1 kg (192 lb)   LMP 2024 (Approximate)   BMI 36.28 kg/m²        FHT:     158 BPM     Uterine Size:   cwd               Assessment/ Plan     Problem List Items Addressed This Visit          Circulatory    Hypertension affecting pregnancy       Infectious/Inflammatory    HSV (herpes simplex virus) anogenital infection       Obstetric    AMA (advanced maternal age) multigravida 35+    High risk pregnancy, antepartum - Primary    Overview     MFM CONSULT  Growth ultrasound 28,32,36 weeks  NSTs weekly starting at 34 weeks secondary to  maternal age, IVF and history of preeclampsia and  FGR.            Other    History of gestational diabetes    Overview     Hx of GDMA1 and BMI=35.   []early 1hr GTT - gave drink          History of pre-eclampsia    Overview     IOL for both babies at 36 weeks for this. States has had some elevated mild range BPS after her deliveries, likely cHTN, will monitor and do baseline labs    Elevated BP in my office .  Normal baseline labs.  Referring to Cards to establish care.  I also suspect cHTN.  She describes having elevated BP after delivering her daughter for 3months, on meds.  After discontinuing meds would be elevated at urgent care but not at PCP.     On ASA 81mg.          [place] product of in vitro fertilization (IVF) pregnancy    Overview     Donor egg this time, husbands sperm.   This is a boy.             Referring to cardiology due to hx of pre-eclampisa/ htn  Normal ealry 1hr  Give bag for next visit for repeat 1hr  - susi do at next visit  Rx " provided for follow up US   Normal AFP             Reviewed practice changes - pt not comfortable staying where she doesn't know new providers.  Discussed options to transfer to [facility] practive which pt declines - states [facility] [place].   Will likely transfer to [facility] practice - info given            Kathy Boyd, DO

## 2025-03-21 ENCOUNTER — ROUTINE PRENATAL (OUTPATIENT)
Dept: OBSTETRICS AND GYNECOLOGY | Facility: CLINIC | Age: 50
End: 2025-03-21
Payer: COMMERCIAL

## 2025-03-21 VITALS
HEIGHT: 61 IN | WEIGHT: 191.4 LBS | SYSTOLIC BLOOD PRESSURE: 134 MMHG | DIASTOLIC BLOOD PRESSURE: 80 MMHG | BODY MASS INDEX: 36.14 KG/M2

## 2025-03-21 DIAGNOSIS — O09.90 HIGH RISK PREGNANCY, ANTEPARTUM: Primary | ICD-10-CM

## 2025-03-21 DIAGNOSIS — O09.522 MULTIGRAVIDA OF ADVANCED MATERNAL AGE IN SECOND TRIMESTER: ICD-10-CM

## 2025-03-21 DIAGNOSIS — Z87.59 HISTORY OF PRE-ECLAMPSIA: ICD-10-CM

## 2025-03-21 DIAGNOSIS — Z86.32 HISTORY OF GESTATIONAL DIABETES: ICD-10-CM

## 2025-03-21 PROCEDURE — 0502F SUBSEQUENT PRENATAL CARE: CPT | Performed by: OBSTETRICS & GYNECOLOGY

## 2025-03-21 PROCEDURE — 36415 COLL VENOUS BLD VENIPUNCTURE: CPT | Performed by: OBSTETRICS & GYNECOLOGY

## 2025-03-21 NOTE — PRENATAL NOTE
All baseline pec labs nl and boy  28 wk labs today afp neg cont us delivery by term  needs to sched all  no issues and advised tdap

## 2025-03-22 ENCOUNTER — RESULTS FOLLOW-UP (OUTPATIENT)
Dept: OBSTETRICS AND GYNECOLOGY | Facility: CLINIC | Age: 50
End: 2025-03-22

## 2025-03-22 LAB
ABO GROUP BLD: NORMAL
BLD GP AB SCN SERPL QL: NEGATIVE
ERYTHROCYTE [DISTWIDTH] IN BLOOD BY AUTOMATED COUNT: 12.5 % (ref 11.7–15.4)
HCT VFR BLD AUTO: 38.2 % (ref 34–46.6)
HGB BLD-MCNC: 12.7 G/DL (ref 11.1–15.9)
HIV 1+2 AB+HIV1 P24 AG SERPL QL IA: NON REACTIVE
MCH RBC QN AUTO: 31.1 PG (ref 26.6–33)
MCHC RBC AUTO-ENTMCNC: 33.2 G/DL (ref 31.5–35.7)
MCV RBC AUTO: 94 FL (ref 79–97)
PLATELET # BLD AUTO: 249 X10E3/UL (ref 150–450)
RBC # BLD AUTO: 4.08 X10E6/UL (ref 3.77–5.28)
RH BLD: POSITIVE
TREPONEMA PALLIDUM IGG+IGM AB [PRESENCE] IN SERUM OR PLASMA BY IMMUNOASSAY: NON REACTIVE
WBC # BLD AUTO: 8.3 X10E3/UL (ref 3.4–10.8)

## 2025-03-23 LAB — GLUCOSE 1H P 50 G GLC PO SERPL-MCNC: 149 MG/DL (ref 70–139)

## 2025-03-25 ENCOUNTER — HOSPITAL ENCOUNTER (OUTPATIENT)
Dept: PERINATAL CARE | Facility: HOSPITAL | Age: 50
Discharge: HOME | End: 2025-03-25
Attending: OBSTETRICS & GYNECOLOGY
Payer: COMMERCIAL

## 2025-03-25 DIAGNOSIS — Z87.59 HISTORY OF PRE-ECLAMPSIA: ICD-10-CM

## 2025-03-25 DIAGNOSIS — Z86.32 HISTORY OF GESTATIONAL DIABETES: Primary | ICD-10-CM

## 2025-03-25 DIAGNOSIS — Z86.32 HISTORY OF GESTATIONAL DIABETES: ICD-10-CM

## 2025-03-25 DIAGNOSIS — O09.522 MULTIGRAVIDA OF ADVANCED MATERNAL AGE IN SECOND TRIMESTER: ICD-10-CM

## 2025-03-25 DIAGNOSIS — O09.90 HIGH RISK PREGNANCY, ANTEPARTUM: ICD-10-CM

## 2025-03-25 PROCEDURE — 76816 OB US FOLLOW-UP PER FETUS: CPT | Performed by: OBSTETRICS & GYNECOLOGY

## 2025-03-25 NOTE — PROGRESS NOTES
Pt is aware needs to perform a 3 hour glucose test.    Patient is to call her lab and let them know she needs an appointment for a 3 hour glucose test  Test is fasting, patient aware  Patient was told she will need to remain at the lab for the duration of the test and that she will have her blood drawn 4 times at one hour intervals.  Verbal understanding from patient.    Order placed for 3 hour glucose test and sent to pt email.

## 2025-03-27 ENCOUNTER — TELEPHONE (OUTPATIENT)
Dept: SCHEDULING | Facility: CLINIC | Age: 50
End: 2025-03-27
Payer: COMMERCIAL

## 2025-03-27 NOTE — TELEPHONE ENCOUNTER
New OB Patient Appointment Request    Name of caller: Roxann Silva     Reason for Visit: Pt is 29 weeks pregnant and has history of high BP    Insurance: [employer]    Recent Cardiac Test/Procedures: none    Referred by: Dr. Kathy Boyd    Primary Care Physician: Jayda Mobley MD     Previous Cardiologist name and phone number: none    Best contact number: [phone]     Additional notes/History: please assist in scheduling as first available was in may

## 2025-04-03 LAB
GLUCOSE 1H P 100 G GLC PO SERPL-MCNC: 198 MG/DL (ref 70–179)
GLUCOSE 2H P 100 G GLC PO SERPL-MCNC: 148 MG/DL (ref 70–154)
GLUCOSE 3H P 100 G GLC PO SERPL-MCNC: 109 MG/DL (ref 70–139)
GLUCOSE P FAST SERPL-MCNC: 87 MG/DL (ref 70–94)
LAB CORP GEST GLUCOSE TOL NOTE:: ABNORMAL

## 2025-04-04 ENCOUNTER — TELEPHONE (OUTPATIENT)
Dept: CARDIOLOGY | Facility: CLINIC | Age: 50
End: 2025-04-04

## 2025-04-04 ENCOUNTER — ROUTINE PRENATAL (OUTPATIENT)
Dept: OBSTETRICS AND GYNECOLOGY | Facility: CLINIC | Age: 50
End: 2025-04-04
Payer: COMMERCIAL

## 2025-04-04 ENCOUNTER — RESULTS FOLLOW-UP (OUTPATIENT)
Dept: OBSTETRICS AND GYNECOLOGY | Facility: CLINIC | Age: 50
End: 2025-04-04

## 2025-04-04 VITALS — WEIGHT: 193 LBS | SYSTOLIC BLOOD PRESSURE: 136 MMHG | DIASTOLIC BLOOD PRESSURE: 88 MMHG | BODY MASS INDEX: 36.47 KG/M2

## 2025-04-04 DIAGNOSIS — A60.9 HSV (HERPES SIMPLEX VIRUS) ANOGENITAL INFECTION: ICD-10-CM

## 2025-04-04 DIAGNOSIS — O09.523 MULTIGRAVIDA OF ADVANCED MATERNAL AGE IN THIRD TRIMESTER: Primary | ICD-10-CM

## 2025-04-04 DIAGNOSIS — Z87.59 HISTORY OF PRE-ECLAMPSIA: ICD-10-CM

## 2025-04-04 DIAGNOSIS — Z86.32 HISTORY OF GESTATIONAL DIABETES: ICD-10-CM

## 2025-04-04 DIAGNOSIS — O09.90 HIGH RISK PREGNANCY, ANTEPARTUM: ICD-10-CM

## 2025-04-04 DIAGNOSIS — O16.3 HYPERTENSION AFFECTING PREGNANCY IN THIRD TRIMESTER: ICD-10-CM

## 2025-04-04 PROCEDURE — 0502F SUBSEQUENT PRENATAL CARE: CPT | Performed by: OBSTETRICS & GYNECOLOGY

## 2025-04-04 NOTE — PROGRESS NOTES
ROUTINE OB VISIT     HPI     Roxann Silva is a 49 y.o. female being seen today for her obstetrical visit. She is at 30w1d gestation. Patient reports no complaints. Fetal movement: normal.   No LOF or VB   No s/sx of preeclampisa  Is seeing cardiology next week                    Review of Systems     ROS performed and negative unless noted in HPI          Objective            Visit Vitals  /88 (BP Location: Left upper arm, Patient Position: Sitting)   Wt 87.5 kg (193 lb)   LMP 2024 (Approximate)   BMI 36.47 kg/m²        FHT:    140 BPM     Uterine Size:  30               Assessment/ Plan     Problem List Items Addressed This Visit          Circulatory    Hypertension affecting pregnancy       Infectious/Inflammatory    HSV (herpes simplex virus) anogenital infection       Obstetric    AMA (advanced maternal age) multigravida 35+ - Primary    Overview   [x]PNL  [x]CF/ SMA  [x]NIPT  [x]NT US  [x]AFP  [x]Anatomy US  [x]28 wk labs  []TDAP  []Flu Shot  []GBS         High risk pregnancy, antepartum    Overview   MFM CONSULT  Growth ultrasound 28,32,36 weeks  NSTs weekly starting at 34 weeks secondary to  maternal age, IVF and history of preeclampsia and  FGR.            Other    History of gestational diabetes    Overview   Hx of GDMA1 and BMI=35.   []early 1hr GTT - gave drink   [x]failed 1hr --> passed 3hr @ 30wks          History of pre-eclampsia    Overview   IOL for both babies at 36 weeks for this. States has had some elevated mild range BPS after her deliveries, likely cHTN, will monitor and do baseline labs    Elevated BP in my office .  Normal baseline labs.  Referring to Cards to establish care.  I also suspect cHTN.  She describes having elevated BP after delivering her daughter for 3months, on meds.  After discontinuing meds would be elevated at urgent care but not at PCP.     On ASA 81mg.          Relevant Orders    Tdap vaccine (>/= 7 yrs old)(ADACEL, BOOSTRIX) IM     product of  in vitro fertilization (IVF) pregnancy    Overview   Donor egg this time, husbands sperm.   This is a boy.             3/25 US normal growth and fluid  Has follow up US and NSTs scheduled     Offer tdap --> will do next visit   Passed 3hr              Kathy Boyd, DO

## 2025-04-07 ENCOUNTER — OFFICE VISIT (OUTPATIENT)
Dept: CARDIOLOGY | Facility: CLINIC | Age: 50
End: 2025-04-07
Payer: COMMERCIAL

## 2025-04-07 VITALS
WEIGHT: 193.6 LBS | SYSTOLIC BLOOD PRESSURE: 138 MMHG | BODY MASS INDEX: 36.55 KG/M2 | DIASTOLIC BLOOD PRESSURE: 90 MMHG | OXYGEN SATURATION: 98 % | HEIGHT: 61 IN | RESPIRATION RATE: 16 BRPM | HEART RATE: 82 BPM

## 2025-04-07 DIAGNOSIS — Z86.32 HISTORY OF GESTATIONAL DIABETES: ICD-10-CM

## 2025-04-07 DIAGNOSIS — O16.3 HYPERTENSION AFFECTING PREGNANCY IN THIRD TRIMESTER: Primary | ICD-10-CM

## 2025-04-07 DIAGNOSIS — Z87.59 HISTORY OF PRE-ECLAMPSIA: ICD-10-CM

## 2025-04-07 LAB
ATRIAL RATE: 82
P AXIS: 33
PR INTERVAL: 144
QRS DURATION: 90
QT INTERVAL: 348
QTC CALCULATION(BAZETT): 406
R AXIS: 69
T WAVE AXIS: 23
VENTRICULAR RATE: 82

## 2025-04-07 PROCEDURE — 3080F DIAST BP >= 90 MM HG: CPT | Performed by: STUDENT IN AN ORGANIZED HEALTH CARE EDUCATION/TRAINING PROGRAM

## 2025-04-07 PROCEDURE — 3075F SYST BP GE 130 - 139MM HG: CPT | Performed by: STUDENT IN AN ORGANIZED HEALTH CARE EDUCATION/TRAINING PROGRAM

## 2025-04-07 PROCEDURE — 93000 ELECTROCARDIOGRAM COMPLETE: CPT | Performed by: STUDENT IN AN ORGANIZED HEALTH CARE EDUCATION/TRAINING PROGRAM

## 2025-04-07 PROCEDURE — 99204 OFFICE O/P NEW MOD 45 MIN: CPT | Performed by: STUDENT IN AN ORGANIZED HEALTH CARE EDUCATION/TRAINING PROGRAM

## 2025-04-07 RX ORDER — LABETALOL 100 MG/1
100 TABLET, FILM COATED ORAL 2 TIMES DAILY
Qty: 60 TABLET | Refills: 1 | Status: ON HOLD | OUTPATIENT
Start: 2025-04-07 | End: 2025-05-21

## 2025-04-07 RX ORDER — ASPIRIN 81 MG/1
81 TABLET ORAL DAILY
COMMUNITY
End: 2025-06-02

## 2025-04-07 ASSESSMENT — ENCOUNTER SYMPTOMS
LIGHT-HEADEDNESS: 0
VOMITING: 0
SORE THROAT: 0
ALTERED MENTAL STATUS: 0
DYSURIA: 0
HEMATURIA: 0
FEVER: 0
CHILLS: 0
LEFT EYE: 0
DYSPNEA ON EXERTION: 0
DIZZINESS: 0
MEMORY LOSS: 0
MUSCLE CRAMPS: 0
ADENOPATHY: 0
RIGHT EYE: 0
SHORTNESS OF BREATH: 0
NAUSEA: 0

## 2025-04-07 NOTE — LETTER
April 7, 2025     Kathy Boyd, DO  [address] [facility] [address]  [facility] [address]    Patient: Roxann Silva  YOB: 1975  Date of Visit: 4/7/2025      Dear Dr. Boyd:    Thank you for referring Roxann Silva to me for evaluation. Below are my notes for this consultation.    If you have questions, please do not hesitate to call me. I look forward to following your patient along with you.         Sincerely,        Candelaria Draper DO        CC: No Recipients    Candelaria Draper DO  4/7/2025 12:02 PM  Signed   Candelaria Draper DO  Cardiology    [facility]  [facility]  [address]  [address]  [place] [address]    [facility]  [address] [facility] [address]    [phone]    [facility]     4/7/2025     Reason for visit: Cardiovascular consultation    Roxann Silva is a 49 y.o. female who presents for cardiovascular consultation.  History obtained from patient and by extensive review of available medical records. She is currently 30 weeks 4 days pregnant.  She has a history of preeclampsia with possible chronic hypertension with her 2 prior pregnancies.  Her prior pregnancies were also complicated by gestational diabetes.     She is 30 weeks pregnant and having a boy. She has been feeling well. She denies cardiac symptoms including chest discomfort, shortness of breath, palpitations, lower extremity edema, presyncope and syncope. Her blood pressures this pregnancy have been normal.    With her first pregnancy her blood pressures became elevated at 36 weeks prompting delivery. She does not think she required anti hypertensive therapy after. Second pregnancy the same events occurred but she did require oral anti hypertensive therapy after this.     Past Medical History:  1. Preeclampsia with suspected chronic hypertension  2. Gestational diabetes, first two pregnancies   3. Asthma   4. IVF  5. Obstetrics history: G3  P0-2-0-2    Past Surgical History:  1.  [place] tooth extraction  2.  Left tendon release    Medications:  Current Outpatient Medications   Medication Sig Dispense Refill    albuterol HFA 90 mcg/actuation inhaler Inhale 2 puffs every 4 (four) hours as needed.      aspirin 81 mg enteric coated tablet Take 81 mg by mouth daily.      fluticasone furoate-vilanteroL (BREO ELLIPTA) 100-25 mcg/dose powder for inhalation INHALE 1 PUFF BY MOUTH EVERY DAY      labetaloL (NORMODYNE) 100 mg tablet Take 1 tablet (100 mg total) by mouth 2 (two) times a day. 60 tablet 1    prenatal no115/iron/folic acid (PRENATAL 19 ORAL) Take by mouth See admin instr.      valACYclovir (VALTREX) 500 mg tablet Take 500 mg by mouth daily.       No current facility-administered medications for this visit.       Allergies: Tree nuts    Social History: Never smoker.  Denies alcohol use.  Denies illicit drug use    Family History: Reviewed and notable for overall unknown but father's side does have heart issues including coronary artery disease. Father with hyperlipidemia.     Review of Systems   Constitutional: Negative for chills and fever.   HENT:  Negative for congestion and sore throat.    Eyes:  Negative for vision loss in left eye and vision loss in right eye.   Cardiovascular:  Negative for chest pain and dyspnea on exertion.   Respiratory:  Negative for shortness of breath.    Endocrine: Negative for cold intolerance and heat intolerance.   Hematologic/Lymphatic: Negative for adenopathy and bleeding problem.   Skin:  Negative for flushing.   Musculoskeletal:  Negative for muscle cramps and muscle weakness.   Gastrointestinal:  Negative for nausea and vomiting.   Genitourinary:  Negative for dysuria and hematuria.   Neurological:  Negative for dizziness and light-headedness.   Psychiatric/Behavioral:  Negative for altered mental status and memory loss.    Allergic/Immunologic: Negative for environmental allergies.        Exam:  Objective  Vitals:    25 0950   BP: (!) 138/90   Pulse: 82   Resp: 16   SpO2: 98%     Body mass index is 36.58 kg/m².  Physical Exam  Constitutional:       Appearance: Normal appearance.   HENT:      Head: Normocephalic and atraumatic.   Eyes:      General: No scleral icterus.  Neck:      Vascular: No JVD.   Cardiovascular:      Rate and Rhythm: Normal rate and regular rhythm.      Heart sounds: No murmur heard.  Pulmonary:      Effort: Pulmonary effort is normal. No respiratory distress.      Breath sounds: Normal breath sounds. No wheezing, rhonchi or rales.   Abdominal:      General: There is no distension.      Palpations: Abdomen is soft.   Musculoskeletal:      Right lower leg: No edema.      Left lower leg: No edema.   Skin:     General: Skin is warm and dry.   Neurological:      Mental Status: She is alert and oriented to person, place, and time.   Psychiatric:         Mood and Affect: Mood normal.         Behavior: Behavior normal.         Labs:  Lab Results   Component Value Date    WBC 8.3 2025    HGB 12.7 2025     2025    ALT 13 2024    AST 17 2024    CREATININE 0.59 2024    HGBA1C 5.4 2024     Personally reviewed, my interpretation: normal renal function     Cardiovascular Studies:     ECG from today personally reviewed and discussed with the patient shows normal sinus rhythm    Assessment/Plan  Problem List Items Addressed This Visit       History of pre-eclampsia    Preeclampsia is a sex-specific cardiovascular risk factor and is associated with premature cardiovascular disease (CVD) including stroke, myocardial infarction, and heart failure with preserved ejection fraction.  --Reviewed that women with a history of adverse pregnancy outcome (APO), including hypertensive disorders of pregnancy (preeclampsia and gestational hypertension), gestational diabetes,  birth, and small for gestational age infant are at increased risk of  future cardiovascular events and risk factor development.   --Plan for aggressive lifelong risk factor optimization: Lifestyle modification discussed, including the importance of heart healthy diet and regular aerobic exercise. Ensure adequate blood pressure control with goal less than 130/80 mmHg. Plan for annual fasting glucose and lipid monitoring with target LDL less than 100.  --Consider coronary calcium score around age 40-45.           Relevant Orders    [facility] MUSE ECG 12 lead (clinic performed) (Completed)    History of gestational diabetes    With first 2 pregnancies.  This pregnancy no gestational diabetes  --Recommend yearly fasting glucose and hemoglobin A1c         Relevant Orders    [facility] MUSE ECG 12 lead (clinic performed) (Completed)    Hypertension affecting pregnancy - Primary    Blood pressure on exam today Normal measuring 138/90 mmHg.  History of preeclampsia with both pregnancies with suspicion for possible chronic hypertension.  --Recommend addition of labetalol 100 mg twice daily  --Continue aspirin 81 mg daily for preeclampsia prevention  --Goal blood pressure throughout pregnancy less than 140/90 mmHg.  The CHAP trial ([facility] 2022) showed that targeting a blood pressure of less than 140/90 mmHg in pregnant women was associated with better outcomes including decreased incidence of preeclampsia, medically indicated  birth, placental abruption, or fetal/ death with no increase in risk of small for gestational age birth weight.          Relevant Medications    labetaloL (NORMODYNE) 100 mg tablet    Other Relevant Orders    [facility] MUSE ECG 12 lead (clinic performed) (Completed)        Candelaria Draper,     This letter was generated using speech recognition software. Please excuse any typographical errors.    Return in about 10 weeks (around 2025).

## 2025-04-07 NOTE — ASSESSMENT & PLAN NOTE
With first 2 pregnancies.  This pregnancy no gestational diabetes  --Recommend yearly fasting glucose and hemoglobin A1c

## 2025-04-07 NOTE — ASSESSMENT & PLAN NOTE
Preeclampsia is a sex-specific cardiovascular risk factor and is associated with premature cardiovascular disease (CVD) including stroke, myocardial infarction, and heart failure with preserved ejection fraction.  --Reviewed that women with a history of adverse pregnancy outcome (APO), including hypertensive disorders of pregnancy (preeclampsia and gestational hypertension), gestational diabetes,  birth, and small for gestational age infant are at increased risk of future cardiovascular events and risk factor development.   --Plan for aggressive lifelong risk factor optimization: Lifestyle modification discussed, including the importance of heart healthy diet and regular aerobic exercise. Ensure adequate blood pressure control with goal less than 130/80 mmHg. Plan for annual fasting glucose and lipid monitoring with target LDL less than 100.  --Consider coronary calcium score around age 40-45.

## 2025-04-07 NOTE — PROGRESS NOTES
Candelaria Draper DO  Cardiology    [facility]ART [facility]  [facility]  [address]  [address]  [place] [address]    [facility]  [address] [facility] [place] [address]    [phone]    [facility]     2025     Reason for visit: Cardiovascular consultation    Roxann Silva is a 49 y.o. female who presents for cardiovascular consultation.  History obtained from patient and by extensive review of available medical records. She is currently 30 weeks 4 days pregnant.  She has a history of preeclampsia with possible chronic hypertension with her 2 prior pregnancies.  Her prior pregnancies were also complicated by gestational diabetes.     She is 30 weeks pregnant and having a boy. She has been feeling well. She denies cardiac symptoms including chest discomfort, shortness of breath, palpitations, lower extremity edema, presyncope and syncope. Her blood pressures this pregnancy have been normal.    With her first pregnancy her blood pressures became elevated at 36 weeks prompting delivery. She does not think she required anti hypertensive therapy after. Second pregnancy the same events occurred but she did require oral anti hypertensive therapy after this.     Past Medical History:  1. Preeclampsia with suspected chronic hypertension  2. Gestational diabetes, first two pregnancies   3. Asthma   4. IVF  5. Obstetrics history: -2-0-2    Past Surgical History:  1.  [place] tooth extraction  2.  Left tendon release    Medications:  Current Outpatient Medications   Medication Sig Dispense Refill    albuterol HFA 90 mcg/actuation inhaler Inhale 2 puffs every 4 (four) hours as needed.      aspirin 81 mg enteric coated tablet Take 81 mg by mouth daily.      fluticasone furoate-vilanteroL (BREO ELLIPTA) 100-25 mcg/dose powder for inhalation INHALE 1 PUFF BY MOUTH EVERY DAY      labetaloL (NORMODYNE) 100 mg tablet Take 1 tablet (100 mg total) by mouth 2 (two)  times a day. 60 tablet 1    prenatal no115/iron/folic acid (PRENATAL 19 ORAL) Take by mouth See admin instr.      valACYclovir (VALTREX) 500 mg tablet Take 500 mg by mouth daily.       No current facility-administered medications for this visit.       Allergies: Tree nuts    Social History: Never smoker.  Denies alcohol use.  Denies illicit drug use    Family History: Reviewed and notable for overall unknown but father's side does have heart issues including coronary artery disease. Father with hyperlipidemia.     Review of Systems   Constitutional: Negative for chills and fever.   HENT:  Negative for congestion and sore throat.    Eyes:  Negative for vision loss in left eye and vision loss in right eye.   Cardiovascular:  Negative for chest pain and dyspnea on exertion.   Respiratory:  Negative for shortness of breath.    Endocrine: Negative for cold intolerance and heat intolerance.   Hematologic/Lymphatic: Negative for adenopathy and bleeding problem.   Skin:  Negative for flushing.   Musculoskeletal:  Negative for muscle cramps and muscle weakness.   Gastrointestinal:  Negative for nausea and vomiting.   Genitourinary:  Negative for dysuria and hematuria.   Neurological:  Negative for dizziness and light-headedness.   Psychiatric/Behavioral:  Negative for altered mental status and memory loss.    Allergic/Immunologic: Negative for environmental allergies.       Exam:  Objective   Vitals:    04/07/25 0950   BP: (!) 138/90   Pulse: 82   Resp: 16   SpO2: 98%     Body mass index is 36.58 kg/m².  Physical Exam  Constitutional:       Appearance: Normal appearance.   HENT:      Head: Normocephalic and atraumatic.   Eyes:      General: No scleral icterus.  Neck:      Vascular: No JVD.   Cardiovascular:      Rate and Rhythm: Normal rate and regular rhythm.      Heart sounds: No murmur heard.  Pulmonary:      Effort: Pulmonary effort is normal. No respiratory distress.      Breath sounds: Normal breath sounds. No  wheezing, rhonchi or rales.   Abdominal:      General: There is no distension.      Palpations: Abdomen is soft.   Musculoskeletal:      Right lower leg: No edema.      Left lower leg: No edema.   Skin:     General: Skin is warm and dry.   Neurological:      Mental Status: She is alert and oriented to person, place, and time.   Psychiatric:         Mood and Affect: Mood normal.         Behavior: Behavior normal.         Labs:  Lab Results   Component Value Date    WBC 8.3 2025    HGB 12.7 2025     2025    ALT 13 2024    AST 17 2024    CREATININE 0.59 2024    HGBA1C 5.4 2024     Personally reviewed, my interpretation: normal renal function     Cardiovascular Studies:     ECG from today personally reviewed and discussed with the patient shows normal sinus rhythm    Assessment/Plan   Problem List Items Addressed This Visit       History of pre-eclampsia    Preeclampsia is a sex-specific cardiovascular risk factor and is associated with premature cardiovascular disease (CVD) including stroke, myocardial infarction, and heart failure with preserved ejection fraction.  --Reviewed that women with a history of adverse pregnancy outcome (APO), including hypertensive disorders of pregnancy (preeclampsia and gestational hypertension), gestational diabetes,  birth, and small for gestational age infant are at increased risk of future cardiovascular events and risk factor development.   --Plan for aggressive lifelong risk factor optimization: Lifestyle modification discussed, including the importance of heart healthy diet and regular aerobic exercise. Ensure adequate blood pressure control with goal less than 130/80 mmHg. Plan for annual fasting glucose and lipid monitoring with target LDL less than 100.  --Consider coronary calcium score around age 40-45.           Relevant Orders    [facility] MUSE ECG 12 lead (clinic performed) (Completed)    History of gestational diabetes     With first 2 pregnancies.  This pregnancy no gestational diabetes  --Recommend yearly fasting glucose and hemoglobin A1c         Relevant Orders    [facility] (Completed)    Hypertension affecting pregnancy - Primary    Blood pressure on exam today Normal measuring 138/90 mmHg.  History of preeclampsia with both pregnancies with suspicion for possible chronic hypertension.  --Recommend addition of labetalol 100 mg twice daily  --Continue aspirin 81 mg daily for preeclampsia prevention  --Goal blood pressure throughout pregnancy less than 140/90 mmHg.  The CHAP trial ([facility] 2022) showed that targeting a blood pressure of less than 140/90 mmHg in pregnant women was associated with better outcomes including decreased incidence of preeclampsia, medically indicated  birth, placental abruption, or fetal/ death with no increase in risk of small for gestational age birth weight.          Relevant Medications    labetaloL (NORMODYNE) 100 mg tablet    Other Relevant Orders    [facility] performed) (Completed)        Candelaria Draper, DO    This letter was generated using speech recognition software. Please excuse any typographical errors.    Return in about 10 weeks (around 2025).

## 2025-04-07 NOTE — ASSESSMENT & PLAN NOTE
Blood pressure on exam today Normal measuring 138/90 mmHg.  History of preeclampsia with both pregnancies with suspicion for possible chronic hypertension.  --Recommend addition of labetalol 100 mg twice daily  --Continue aspirin 81 mg daily for preeclampsia prevention  --Goal blood pressure throughout pregnancy less than 140/90 mmHg.  The CHAP trial ([facility] 2022) showed that targeting a blood pressure of less than 140/90 mmHg in pregnant women was associated with better outcomes including decreased incidence of preeclampsia, medically indicated  birth, placental abruption, or fetal/ death with no increase in risk of small for gestational age birth weight.

## 2025-04-22 ENCOUNTER — HOSPITAL ENCOUNTER (OUTPATIENT)
Dept: PERINATAL CARE | Facility: HOSPITAL | Age: 50
Discharge: HOME | End: 2025-04-22
Attending: OBSTETRICS & GYNECOLOGY
Payer: COMMERCIAL

## 2025-04-22 DIAGNOSIS — Z86.32 HISTORY OF GESTATIONAL DIABETES: ICD-10-CM

## 2025-04-22 DIAGNOSIS — O09.522 MULTIGRAVIDA OF ADVANCED MATERNAL AGE IN SECOND TRIMESTER: ICD-10-CM

## 2025-04-22 DIAGNOSIS — O09.90 HIGH RISK PREGNANCY, ANTEPARTUM: ICD-10-CM

## 2025-04-22 DIAGNOSIS — Z87.59 HISTORY OF PRE-ECLAMPSIA: ICD-10-CM

## 2025-04-22 PROCEDURE — 76816 OB US FOLLOW-UP PER FETUS: CPT | Performed by: OBSTETRICS & GYNECOLOGY

## 2025-04-23 ENCOUNTER — ROUTINE PRENATAL (OUTPATIENT)
Dept: OBSTETRICS AND GYNECOLOGY | Facility: CLINIC | Age: 50
End: 2025-04-23
Payer: COMMERCIAL

## 2025-04-23 VITALS — WEIGHT: 197 LBS | DIASTOLIC BLOOD PRESSURE: 82 MMHG | BODY MASS INDEX: 37.22 KG/M2 | SYSTOLIC BLOOD PRESSURE: 136 MMHG

## 2025-04-23 DIAGNOSIS — Z87.59 HISTORY OF PRE-ECLAMPSIA: ICD-10-CM

## 2025-04-23 DIAGNOSIS — Z86.32 HISTORY OF GESTATIONAL DIABETES: ICD-10-CM

## 2025-04-23 DIAGNOSIS — O09.90 HIGH RISK PREGNANCY, ANTEPARTUM: ICD-10-CM

## 2025-04-23 DIAGNOSIS — Z86.79 HISTORY OF HYPERTENSION: ICD-10-CM

## 2025-04-23 DIAGNOSIS — Z34.90 PREGNANCY, UNSPECIFIED GESTATIONAL AGE: Primary | ICD-10-CM

## 2025-04-23 DIAGNOSIS — O16.3 HYPERTENSION AFFECTING PREGNANCY IN THIRD TRIMESTER: ICD-10-CM

## 2025-04-23 DIAGNOSIS — Z36.9 ENCOUNTER FOR ANTENATAL SCREENING: ICD-10-CM

## 2025-04-23 DIAGNOSIS — Z3A.32 32 WEEKS GESTATION OF PREGNANCY: Primary | ICD-10-CM

## 2025-04-23 DIAGNOSIS — O09.523 MULTIGRAVIDA OF ADVANCED MATERNAL AGE IN THIRD TRIMESTER: ICD-10-CM

## 2025-04-23 NOTE — PROGRESS NOTES
Patient presents for prenatal care at 32 weeks 6 days.  Patient offers no complaints.  Denies headaches,visual disturbances,chest pain,labor contractions,or leakage of fluid.  Reports adequate fetal movement.  /82  HPI:36 weeks  ROS :Negative.  Assessment:IUP @ 36 weeks.                       Chronic Hypertension.  Plan: Labor precautions reviewed.           Discussed LLBR.           Counseled on continued Medication.           Discussed protocol and delivery plan.           Discussed prenatal parameters.           RTO PRN or 1 week.

## 2025-04-23 NOTE — PROGRESS NOTES
Patient advised order has been placed for growth scan discussed at visit today.  Number provided to call to schedule.

## 2025-04-30 ENCOUNTER — TELEPHONE (OUTPATIENT)
Dept: OBSTETRICS AND GYNECOLOGY | Facility: CLINIC | Age: 50
End: 2025-04-30

## 2025-05-07 ENCOUNTER — ROUTINE PRENATAL (OUTPATIENT)
Dept: OBSTETRICS AND GYNECOLOGY | Facility: CLINIC | Age: 50
End: 2025-05-07
Payer: COMMERCIAL

## 2025-05-07 VITALS
WEIGHT: 202 LBS | BODY MASS INDEX: 38.14 KG/M2 | DIASTOLIC BLOOD PRESSURE: 90 MMHG | SYSTOLIC BLOOD PRESSURE: 140 MMHG | HEIGHT: 61 IN

## 2025-05-07 DIAGNOSIS — O09.523 MULTIGRAVIDA OF ADVANCED MATERNAL AGE IN THIRD TRIMESTER: Primary | ICD-10-CM

## 2025-05-07 DIAGNOSIS — Z3A.34 34 WEEKS GESTATION OF PREGNANCY: ICD-10-CM

## 2025-05-07 DIAGNOSIS — O09.90 HIGH RISK PREGNANCY, ANTEPARTUM: ICD-10-CM

## 2025-05-16 ENCOUNTER — ANESTHESIA (OUTPATIENT)
Dept: OBSTETRICS AND GYNECOLOGY | Facility: HOSPITAL | Age: 50
Setting detail: OBSERVATION
End: 2025-05-16
Payer: COMMERCIAL

## 2025-05-16 ENCOUNTER — HOSPITAL ENCOUNTER (OUTPATIENT)
Dept: PERINATAL CARE | Facility: HOSPITAL | Age: 50
Discharge: HOME | End: 2025-05-16
Attending: OBSTETRICS & GYNECOLOGY
Payer: COMMERCIAL

## 2025-05-16 ENCOUNTER — ROUTINE PRENATAL (OUTPATIENT)
Dept: OBSTETRICS AND GYNECOLOGY | Facility: CLINIC | Age: 50
End: 2025-05-16
Payer: COMMERCIAL

## 2025-05-16 ENCOUNTER — HOSPITAL ENCOUNTER (INPATIENT)
Facility: HOSPITAL | Age: 50
LOS: 4 days | Discharge: HOME | End: 2025-05-21
Attending: OBSTETRICS & GYNECOLOGY | Admitting: OBSTETRICS & GYNECOLOGY
Payer: COMMERCIAL

## 2025-05-16 VITALS
HEIGHT: 61 IN | SYSTOLIC BLOOD PRESSURE: 145 MMHG | BODY MASS INDEX: 38.82 KG/M2 | WEIGHT: 205.6 LBS | DIASTOLIC BLOOD PRESSURE: 89 MMHG

## 2025-05-16 VITALS — DIASTOLIC BLOOD PRESSURE: 78 MMHG | SYSTOLIC BLOOD PRESSURE: 153 MMHG

## 2025-05-16 DIAGNOSIS — Z36.9 ENCOUNTER FOR ANTENATAL SCREENING: ICD-10-CM

## 2025-05-16 DIAGNOSIS — Z86.32 HISTORY OF GESTATIONAL DIABETES: ICD-10-CM

## 2025-05-16 DIAGNOSIS — O16.3 HYPERTENSION AFFECTING PREGNANCY IN THIRD TRIMESTER: ICD-10-CM

## 2025-05-16 DIAGNOSIS — Z87.59 HISTORY OF PRE-ECLAMPSIA: ICD-10-CM

## 2025-05-16 DIAGNOSIS — O09.523 MULTIGRAVIDA OF ADVANCED MATERNAL AGE IN THIRD TRIMESTER: Primary | ICD-10-CM

## 2025-05-16 DIAGNOSIS — O09.90 HIGH RISK PREGNANCY, ANTEPARTUM: ICD-10-CM

## 2025-05-16 DIAGNOSIS — Z86.79 HISTORY OF HYPERTENSION: ICD-10-CM

## 2025-05-16 DIAGNOSIS — O09.523 MULTIGRAVIDA OF ADVANCED MATERNAL AGE IN THIRD TRIMESTER: ICD-10-CM

## 2025-05-16 DIAGNOSIS — O32.9XX1 MALPRESENTATION BEFORE ONSET OF LABOR, FETUS 1 OF MULTIPLE GESTATION: Primary | ICD-10-CM

## 2025-05-16 DIAGNOSIS — Z3A.39 39 WEEKS GESTATION OF PREGNANCY: ICD-10-CM

## 2025-05-16 PROBLEM — O13.3 GESTATIONAL HYPERTENSION, THIRD TRIMESTER: Status: ACTIVE | Noted: 2025-05-16

## 2025-05-16 LAB
A1 MICROGLOB PLACENTAL VAG QL: NEGATIVE
ABO + RH BLD: NORMAL
ABO + RH BLD: NORMAL
ALT SERPL-CCNC: 13 IU/L (ref 7–52)
AST SERPL-CCNC: 22 IU/L (ref 13–39)
BLD GP AB SCN SERPL QL: NEGATIVE
BLOOD URINE, POC: ABNORMAL
CREAT SERPL-MCNC: 0.6 MG/DL (ref 0.6–1.2)
CREAT UR-MCNC: 113.4 MG/DL
D AG BLD QL: POSITIVE
D AG BLD QL: POSITIVE
EGFRCR SERPLBLD CKD-EPI 2021: >60 ML/MIN/1.73M*2
ERYTHROCYTE [DISTWIDTH] IN BLOOD BY AUTOMATED COUNT: 12.5 % (ref 11.7–14.4)
EXPIRATION DATE: ABNORMAL
GLUCOSE URINE, POC: NEGATIVE
HCT VFR BLD AUTO: 34.7 % (ref 35–45)
HGB BLD-MCNC: 11.4 G/DL (ref 11.8–15.7)
KETONES, POC: NEGATIVE
LABORATORY COMMENT REPORT: NORMAL
LDH SERPL L TO P-CCNC: 256 IU/L (ref 98–271)
LEUKOCYTE EST, POC: NEGATIVE
Lab: ABNORMAL
MCH RBC QN AUTO: 29 PG (ref 28–33.2)
MCHC RBC AUTO-ENTMCNC: 32.9 G/DL (ref 32.2–35.5)
MCV RBC AUTO: 88.3 FL (ref 83–98)
NITRITE, POC: NEGATIVE
PLATELET # BLD AUTO: 217 K/UL (ref 150–369)
PMV BLD AUTO: 12.1 FL (ref 9.4–12.3)
POCT MANUFACTURER: ABNORMAL
PROT UR-MCNC: 44 MG/DL
PROT/CREAT UR: 0.39 MG/G CREAT
PROTEIN, POC: ABNORMAL
RBC # BLD AUTO: 3.93 M/UL (ref 3.93–5.22)
SPECIMEN EXP DATE BLD: NORMAL
URATE SERPL-MCNC: 3.5 MG/DL (ref 2.3–6)
WBC # BLD AUTO: 8.75 K/UL (ref 3.8–10.5)

## 2025-05-16 PROCEDURE — 83615 LACTATE (LD) (LDH) ENZYME: CPT | Performed by: OBSTETRICS & GYNECOLOGY

## 2025-05-16 PROCEDURE — G0378 HOSPITAL OBSERVATION PER HR: HCPCS

## 2025-05-16 PROCEDURE — 84550 ASSAY OF BLOOD/URIC ACID: CPT | Performed by: OBSTETRICS & GYNECOLOGY

## 2025-05-16 PROCEDURE — 84450 TRANSFERASE (AST) (SGOT): CPT | Performed by: OBSTETRICS & GYNECOLOGY

## 2025-05-16 PROCEDURE — 25000000 HC PHARMACY GENERAL: Performed by: STUDENT IN AN ORGANIZED HEALTH CARE EDUCATION/TRAINING PROGRAM

## 2025-05-16 PROCEDURE — 76815 OB US LIMITED FETUS(S): CPT | Performed by: OBSTETRICS & GYNECOLOGY

## 2025-05-16 PROCEDURE — 27200130 HC EPIDURAL ANES TRAY

## 2025-05-16 PROCEDURE — 84112 EVAL AMNIOTIC FLUID PROTEIN: CPT | Performed by: OBSTETRICS & GYNECOLOGY

## 2025-05-16 PROCEDURE — 85027 COMPLETE CBC AUTOMATED: CPT | Performed by: OBSTETRICS & GYNECOLOGY

## 2025-05-16 PROCEDURE — 86850 RBC ANTIBODY SCREEN: CPT

## 2025-05-16 PROCEDURE — 63600000 HC DRUGS/DETAIL CODE: Mod: JZ | Performed by: STUDENT IN AN ORGANIZED HEALTH CARE EDUCATION/TRAINING PROGRAM

## 2025-05-16 PROCEDURE — 84460 ALANINE AMINO (ALT) (SGPT): CPT | Performed by: OBSTETRICS & GYNECOLOGY

## 2025-05-16 PROCEDURE — 96374 THER/PROPH/DIAG INJ IV PUSH: CPT | Mod: 59

## 2025-05-16 PROCEDURE — 25000000 HC PHARMACY GENERAL: Performed by: OBSTETRICS & GYNECOLOGY

## 2025-05-16 PROCEDURE — 84156 ASSAY OF PROTEIN URINE: CPT | Performed by: OBSTETRICS & GYNECOLOGY

## 2025-05-16 PROCEDURE — 36415 COLL VENOUS BLD VENIPUNCTURE: CPT | Performed by: OBSTETRICS & GYNECOLOGY

## 2025-05-16 PROCEDURE — 63600000 HC DRUGS/DETAIL CODE: Mod: JZ

## 2025-05-16 PROCEDURE — 96376 TX/PRO/DX INJ SAME DRUG ADON: CPT

## 2025-05-16 PROCEDURE — 63700000 HC SELF-ADMINISTRABLE DRUG: Performed by: OBSTETRICS & GYNECOLOGY

## 2025-05-16 PROCEDURE — 63600000 HC DRUGS/DETAIL CODE: Mod: JZ | Performed by: OBSTETRICS & GYNECOLOGY

## 2025-05-16 PROCEDURE — 82565 ASSAY OF CREATININE: CPT | Performed by: OBSTETRICS & GYNECOLOGY

## 2025-05-16 PROCEDURE — 63600000 HC DRUGS/DETAIL CODE: Mod: JW | Performed by: STUDENT IN AN ORGANIZED HEALTH CARE EDUCATION/TRAINING PROGRAM

## 2025-05-16 PROCEDURE — 59025 FETAL NON-STRESS TEST: CPT | Performed by: OBSTETRICS & GYNECOLOGY

## 2025-05-16 PROCEDURE — 96375 TX/PRO/DX INJ NEW DRUG ADDON: CPT

## 2025-05-16 PROCEDURE — 96372 THER/PROPH/DIAG INJ SC/IM: CPT | Mod: 59

## 2025-05-16 RX ORDER — HYDRALAZINE HYDROCHLORIDE 20 MG/ML
10 INJECTION INTRAMUSCULAR; INTRAVENOUS ONCE
Status: COMPLETED | OUTPATIENT
Start: 2025-05-16 | End: 2025-05-16

## 2025-05-16 RX ORDER — LABETALOL 200 MG/1
200 TABLET, FILM COATED ORAL EVERY 8 HOURS
Status: DISCONTINUED | OUTPATIENT
Start: 2025-05-16 | End: 2025-05-17

## 2025-05-16 RX ORDER — OXYTOCIN/0.9 % SODIUM CHLORIDE 30/500 ML
0-20 PLASTIC BAG, INJECTION (ML) INTRAVENOUS
Status: DISCONTINUED | OUTPATIENT
Start: 2025-05-16 | End: 2025-05-18

## 2025-05-16 RX ORDER — LABETALOL 100 MG/1
100 TABLET, FILM COATED ORAL ONCE
Status: ACTIVE | OUTPATIENT
Start: 2025-05-16 | End: 2025-05-17

## 2025-05-16 RX ORDER — LABETALOL HCL 20 MG/4 ML
40 SYRINGE (ML) INTRAVENOUS ONCE
Status: COMPLETED | OUTPATIENT
Start: 2025-05-16 | End: 2025-05-16

## 2025-05-16 RX ORDER — LABETALOL HCL 20 MG/4 ML
20 SYRINGE (ML) INTRAVENOUS ONCE
Status: COMPLETED | OUTPATIENT
Start: 2025-05-16 | End: 2025-05-16

## 2025-05-16 RX ORDER — CALCIUM GLUCONATE 98 MG/ML
1 INJECTION, SOLUTION INTRAVENOUS ONCE AS NEEDED
Status: DISCONTINUED | OUTPATIENT
Start: 2025-05-16 | End: 2025-05-21 | Stop reason: HOSPADM

## 2025-05-16 RX ORDER — LABETALOL 100 MG/1
100 TABLET, FILM COATED ORAL EVERY 8 HOURS
Status: DISCONTINUED | OUTPATIENT
Start: 2025-05-16 | End: 2025-05-16

## 2025-05-16 RX ORDER — ONDANSETRON HYDROCHLORIDE 2 MG/ML
4 INJECTION, SOLUTION INTRAVENOUS EVERY 6 HOURS PRN
Status: DISCONTINUED | OUTPATIENT
Start: 2025-05-16 | End: 2025-05-21 | Stop reason: HOSPADM

## 2025-05-16 RX ORDER — LABETALOL HCL 20 MG/4 ML
SYRINGE (ML) INTRAVENOUS
Status: COMPLETED
Start: 2025-05-16 | End: 2025-05-16

## 2025-05-16 RX ORDER — TERBUTALINE SULFATE 1 MG/ML
0.25 INJECTION SUBCUTANEOUS ONCE
Status: COMPLETED | OUTPATIENT
Start: 2025-05-16 | End: 2025-05-16

## 2025-05-16 RX ORDER — ACETAMINOPHEN 325 MG/1
650 TABLET ORAL EVERY 6 HOURS PRN
Status: DISCONTINUED | OUTPATIENT
Start: 2025-05-16 | End: 2025-05-21 | Stop reason: HOSPADM

## 2025-05-16 RX ORDER — SODIUM CHLORIDE, SODIUM LACTATE, POTASSIUM CHLORIDE, CALCIUM CHLORIDE 600; 310; 30; 20 MG/100ML; MG/100ML; MG/100ML; MG/100ML
INJECTION, SOLUTION INTRAVENOUS CONTINUOUS
Status: ACTIVE | OUTPATIENT
Start: 2025-05-16 | End: 2025-05-17

## 2025-05-16 RX ORDER — LABETALOL HCL 20 MG/4 ML
80 SYRINGE (ML) INTRAVENOUS ONCE
Status: ACTIVE | OUTPATIENT
Start: 2025-05-16 | End: 2025-05-17

## 2025-05-16 RX ORDER — BUPIVACAINE HYDROCHLORIDE 2.5 MG/ML
INJECTION, SOLUTION EPIDURAL; INFILTRATION; INTRACAUDAL; PERINEURAL AS NEEDED
Status: DISCONTINUED | OUTPATIENT
Start: 2025-05-16 | End: 2025-05-17 | Stop reason: SURG

## 2025-05-16 RX ORDER — SODIUM CHLORIDE 9 MG/ML
INJECTION, SOLUTION INTRAMUSCULAR; INTRAVENOUS; SUBCUTANEOUS AS NEEDED
Status: DISCONTINUED | OUTPATIENT
Start: 2025-05-16 | End: 2025-05-17 | Stop reason: SURG

## 2025-05-16 RX ORDER — MAGNESIUM SULFATE HEPTAHYDRATE 40 MG/ML
2 INJECTION, SOLUTION INTRAVENOUS CONTINUOUS
Status: DISCONTINUED | OUTPATIENT
Start: 2025-05-16 | End: 2025-05-18

## 2025-05-16 RX ORDER — PHENYLEPHRINE HYDROCHLORIDE 10 MG/ML
INJECTION INTRAVENOUS AS NEEDED
Status: DISCONTINUED | OUTPATIENT
Start: 2025-05-16 | End: 2025-05-17 | Stop reason: SURG

## 2025-05-16 RX ORDER — FENTANYL/ROPIVACAINE/NS/PF 2-1500 MCG
PREFILLED PUMP RESERVOIR INJECTION CONTINUOUS
Refills: 0 | Status: DISCONTINUED | OUTPATIENT
Start: 2025-05-16 | End: 2025-05-18

## 2025-05-16 RX ORDER — LIDOCAINE HYDROCHLORIDE AND EPINEPHRINE 15; 5 MG/ML; UG/ML
INJECTION, SOLUTION EPIDURAL AS NEEDED
Status: DISCONTINUED | OUTPATIENT
Start: 2025-05-16 | End: 2025-05-17 | Stop reason: SURG

## 2025-05-16 RX ORDER — EPHEDRINE SULFATE/0.9% NACL/PF 50 MG/5 ML
10 SYRINGE (ML) INTRAVENOUS EVERY 10 MIN PRN
Status: DISCONTINUED | OUTPATIENT
Start: 2025-05-16 | End: 2025-05-18

## 2025-05-16 RX ADMIN — TERBUTALINE SULFATE 0.25 MG: 1 INJECTION, SOLUTION SUBCUTANEOUS at 19:50

## 2025-05-16 RX ADMIN — ONDANSETRON 4 MG: 2 INJECTION INTRAMUSCULAR; INTRAVENOUS at 21:58

## 2025-05-16 RX ADMIN — LABETALOL HYDROCHLORIDE 200 MG: 200 TABLET ORAL at 15:29

## 2025-05-16 RX ADMIN — Medication 40 MG: at 13:36

## 2025-05-16 RX ADMIN — LIDOCAINE HYDROCHLORIDE,EPINEPHRINE BITARTRATE 3 ML: 15; .005 INJECTION, SOLUTION EPIDURAL; INFILTRATION; INTRACAUDAL; PERINEURAL at 18:48

## 2025-05-16 RX ADMIN — MAGNESIUM SULFATE HEPTAHYDRATE 4 G: 40 INJECTION, SOLUTION INTRAVENOUS at 13:23

## 2025-05-16 RX ADMIN — PHENYLEPHRINE HYDROCHLORIDE 200 MCG: 10 INJECTION INTRAVENOUS at 19:15

## 2025-05-16 RX ADMIN — LABETALOL HYDROCHLORIDE 40 MG: 5 INJECTION, SOLUTION INTRAVENOUS at 13:36

## 2025-05-16 RX ADMIN — SODIUM CHLORIDE, POTASSIUM CHLORIDE, SODIUM LACTATE AND CALCIUM CHLORIDE 1000 ML: 600; 310; 30; 20 INJECTION, SOLUTION INTRAVENOUS at 19:15

## 2025-05-16 RX ADMIN — ACETAMINOPHEN 650 MG: 325 TABLET ORAL at 17:03

## 2025-05-16 RX ADMIN — PHENYLEPHRINE HYDROCHLORIDE 100 MCG: 10 INJECTION INTRAVENOUS at 19:23

## 2025-05-16 RX ADMIN — MAGNESIUM SULFATE HEPTAHYDRATE 2 G/HR: 40 INJECTION, SOLUTION INTRAVENOUS at 13:56

## 2025-05-16 RX ADMIN — SODIUM CHLORIDE 2.5 ML: 9 INJECTION, SOLUTION INTRAMUSCULAR; INTRAVENOUS; SUBCUTANEOUS at 18:53

## 2025-05-16 RX ADMIN — LABETALOL HYDROCHLORIDE 20 MG: 5 INJECTION, SOLUTION INTRAVENOUS at 12:58

## 2025-05-16 RX ADMIN — SODIUM CHLORIDE 2.5 ML: 9 INJECTION, SOLUTION INTRAMUSCULAR; INTRAVENOUS; SUBCUTANEOUS at 18:58

## 2025-05-16 RX ADMIN — BUPIVACAINE HYDROCHLORIDE 2.5 ML: 2.5 INJECTION, SOLUTION EPIDURAL; INFILTRATION; INTRACAUDAL; PERINEURAL at 18:58

## 2025-05-16 RX ADMIN — BUPIVACAINE HYDROCHLORIDE 2.5 ML: 2.5 INJECTION, SOLUTION EPIDURAL; INFILTRATION; INTRACAUDAL; PERINEURAL at 18:53

## 2025-05-16 RX ADMIN — PHENYLEPHRINE HYDROCHLORIDE 200 MCG: 10 INJECTION INTRAVENOUS at 19:29

## 2025-05-16 RX ADMIN — SODIUM CHLORIDE, POTASSIUM CHLORIDE, SODIUM LACTATE AND CALCIUM CHLORIDE: 600; 310; 30; 20 INJECTION, SOLUTION INTRAVENOUS at 13:09

## 2025-05-16 RX ADMIN — Medication 50 ML: at 21:16

## 2025-05-16 RX ADMIN — Medication 2 MILLI-UNITS/MIN: at 21:17

## 2025-05-16 RX ADMIN — HYDRALAZINE HYDROCHLORIDE 10 MG: 20 INJECTION INTRAMUSCULAR; INTRAVENOUS at 16:19

## 2025-05-17 PROBLEM — O32.9XX0 MALPRESENTATION BEFORE ONSET OF LABOR: Status: ACTIVE | Noted: 2025-05-16

## 2025-05-17 LAB
ALBUMIN SERPL-MCNC: 3.5 G/DL (ref 3.9–4.9)
ALP SERPL-CCNC: 111 IU/L (ref 44–121)
ALT SERPL-CCNC: 15 IU/L (ref 0–32)
AST SERPL-CCNC: 23 IU/L (ref 0–40)
BASOPHILS # BLD AUTO: 0 X10E3/UL (ref 0–0.2)
BASOPHILS NFR BLD AUTO: 1 %
BILIRUB SERPL-MCNC: 0.2 MG/DL (ref 0–1.2)
BUN SERPL-MCNC: 10 MG/DL (ref 6–24)
BUN/CREAT SERPL: 17 (ref 9–23)
CALCIUM SERPL-MCNC: 8.7 MG/DL (ref 8.7–10.2)
CHLORIDE SERPL-SCNC: 106 MMOL/L (ref 96–106)
CO2 SERPL-SCNC: 18 MMOL/L (ref 20–29)
CREAT SERPL-MCNC: 0.6 MG/DL (ref 0.57–1)
EGFRCR SERPLBLD CKD-EPI 2021: 111 ML/MIN/1.73
EOSINOPHIL # BLD AUTO: 0 X10E3/UL (ref 0–0.4)
EOSINOPHIL NFR BLD AUTO: 0 %
ERYTHROCYTE [DISTWIDTH] IN BLOOD BY AUTOMATED COUNT: 13 % (ref 11.7–15.4)
GLOBULIN SER CALC-MCNC: 2.5 G/DL (ref 1.5–4.5)
GLUCOSE SERPL-MCNC: 126 MG/DL (ref 70–99)
HCT VFR BLD AUTO: 34.6 % (ref 34–46.6)
HGB BLD-MCNC: 11.1 G/DL (ref 11.1–15.9)
IMM GRANULOCYTES # BLD AUTO: 0 X10E3/UL (ref 0–0.1)
IMM GRANULOCYTES NFR BLD AUTO: 0 %
LYMPHOCYTES # BLD AUTO: 1.9 X10E3/UL (ref 0.7–3.1)
LYMPHOCYTES NFR BLD AUTO: 23 %
MAGNESIUM SERPL-MCNC: 5.7 MG/DL (ref 1.8–2.5)
MAGNESIUM SERPL-MCNC: 7 MG/DL (ref 1.8–2.5)
MCH RBC QN AUTO: 29.4 PG (ref 26.6–33)
MCHC RBC AUTO-ENTMCNC: 32.1 G/DL (ref 31.5–35.7)
MCV RBC AUTO: 92 FL (ref 79–97)
MONOCYTES # BLD AUTO: 0.7 X10E3/UL (ref 0.1–0.9)
MONOCYTES NFR BLD AUTO: 9 %
NEUTROPHILS # BLD AUTO: 5.5 X10E3/UL (ref 1.4–7)
NEUTROPHILS NFR BLD AUTO: 67 %
PLATELET # BLD AUTO: 199 X10E3/UL (ref 150–450)
POTASSIUM SERPL-SCNC: 3.8 MMOL/L (ref 3.5–5.2)
PROT SERPL-MCNC: 6 G/DL (ref 6–8.5)
RBC # BLD AUTO: 3.78 X10E6/UL (ref 3.77–5.28)
SODIUM SERPL-SCNC: 139 MMOL/L (ref 134–144)
WBC # BLD AUTO: 8.1 X10E3/UL (ref 3.4–10.8)

## 2025-05-17 PROCEDURE — 88307 TISSUE EXAM BY PATHOLOGIST: CPT | Performed by: OBSTETRICS & GYNECOLOGY

## 2025-05-17 PROCEDURE — 25000000 HC PHARMACY GENERAL: Performed by: OBSTETRICS & GYNECOLOGY

## 2025-05-17 PROCEDURE — 96376 TX/PRO/DX INJ SAME DRUG ADON: CPT

## 2025-05-17 PROCEDURE — 71000001 HC PACU PHASE 1 INITIAL 30MIN: Performed by: OBSTETRICS & GYNECOLOGY

## 2025-05-17 PROCEDURE — 83735 ASSAY OF MAGNESIUM: CPT | Performed by: OBSTETRICS & GYNECOLOGY

## 2025-05-17 PROCEDURE — 63600000 HC DRUGS/DETAIL CODE: Performed by: STUDENT IN AN ORGANIZED HEALTH CARE EDUCATION/TRAINING PROGRAM

## 2025-05-17 PROCEDURE — 96365 THER/PROPH/DIAG IV INF INIT: CPT

## 2025-05-17 PROCEDURE — 25000000 HC PHARMACY GENERAL: Performed by: NURSE ANESTHETIST, CERTIFIED REGISTERED

## 2025-05-17 PROCEDURE — 63600000 HC DRUGS/DETAIL CODE: Mod: JZ | Performed by: ANESTHESIOLOGY

## 2025-05-17 PROCEDURE — 63700000 HC SELF-ADMINISTRABLE DRUG: Performed by: OBSTETRICS & GYNECOLOGY

## 2025-05-17 PROCEDURE — 10S0XZZ REPOSITION PRODUCTS OF CONCEPTION, EXTERNAL APPROACH: ICD-10-PCS | Performed by: OBSTETRICS & GYNECOLOGY

## 2025-05-17 PROCEDURE — 25800000 HC PHARMACY IV SOLUTIONS: Performed by: OBSTETRICS & GYNECOLOGY

## 2025-05-17 PROCEDURE — 72000021 HC C SECTION LEVEL 1: Performed by: OBSTETRICS & GYNECOLOGY

## 2025-05-17 PROCEDURE — 37000005 HC ANESTHESIA EPIDURAL/SPINAL: Performed by: OBSTETRICS & GYNECOLOGY

## 2025-05-17 PROCEDURE — 25000000 HC PHARMACY GENERAL: Performed by: STUDENT IN AN ORGANIZED HEALTH CARE EDUCATION/TRAINING PROGRAM

## 2025-05-17 PROCEDURE — 25000000 HC PHARMACY GENERAL: Performed by: ANESTHESIOLOGY

## 2025-05-17 PROCEDURE — 63600000 HC DRUGS/DETAIL CODE: Mod: JZ | Performed by: OBSTETRICS & GYNECOLOGY

## 2025-05-17 PROCEDURE — G0378 HOSPITAL OBSERVATION PER HR: HCPCS

## 2025-05-17 PROCEDURE — 63600000 HC DRUGS/DETAIL CODE: Mod: JZ | Performed by: NURSE ANESTHETIST, CERTIFIED REGISTERED

## 2025-05-17 PROCEDURE — 83735 ASSAY OF MAGNESIUM: CPT | Performed by: ANESTHESIOLOGY

## 2025-05-17 PROCEDURE — 71000011 HC PACU PHASE 1 EA ADDL MIN: Performed by: OBSTETRICS & GYNECOLOGY

## 2025-05-17 PROCEDURE — 36415 COLL VENOUS BLD VENIPUNCTURE: CPT | Performed by: ANESTHESIOLOGY

## 2025-05-17 PROCEDURE — 12000000 HC ROOM AND CARE MED/SURG

## 2025-05-17 RX ORDER — ALUMINUM HYDROXIDE, MAGNESIUM HYDROXIDE, AND SIMETHICONE 1200; 120; 1200 MG/30ML; MG/30ML; MG/30ML
30 SUSPENSION ORAL EVERY 4 HOURS PRN
Status: DISCONTINUED | OUTPATIENT
Start: 2025-05-17 | End: 2025-05-21 | Stop reason: HOSPADM

## 2025-05-17 RX ORDER — MISOPROSTOL 200 UG/1
1000 TABLET ORAL ONCE AS NEEDED
Status: DISCONTINUED | OUTPATIENT
Start: 2025-05-17 | End: 2025-05-21 | Stop reason: HOSPADM

## 2025-05-17 RX ORDER — METOCLOPRAMIDE HYDROCHLORIDE 5 MG/ML
INJECTION INTRAMUSCULAR; INTRAVENOUS AS NEEDED
Status: DISCONTINUED | OUTPATIENT
Start: 2025-05-17 | End: 2025-05-17 | Stop reason: SURG

## 2025-05-17 RX ORDER — CALCIUM CARBONATE 200(500)MG
200 TABLET,CHEWABLE ORAL EVERY 4 HOURS PRN
Status: DISCONTINUED | OUTPATIENT
Start: 2025-05-17 | End: 2025-05-21 | Stop reason: HOSPADM

## 2025-05-17 RX ORDER — OXYTOCIN 10 [USP'U]/ML
10 INJECTION, SOLUTION INTRAMUSCULAR; INTRAVENOUS ONCE AS NEEDED
Status: DISCONTINUED | OUTPATIENT
Start: 2025-05-17 | End: 2025-05-21 | Stop reason: HOSPADM

## 2025-05-17 RX ORDER — TRANEXAMIC ACID 10 MG/ML
1000 INJECTION, SOLUTION INTRAVENOUS ONCE AS NEEDED
Status: DISCONTINUED | OUTPATIENT
Start: 2025-05-17 | End: 2025-05-21 | Stop reason: HOSPADM

## 2025-05-17 RX ORDER — OXYTOCIN/0.9 % SODIUM CHLORIDE 30/500 ML
PLASTIC BAG, INJECTION (ML) INTRAVENOUS CONTINUOUS PRN
Status: DISCONTINUED | OUTPATIENT
Start: 2025-05-17 | End: 2025-05-17 | Stop reason: SURG

## 2025-05-17 RX ORDER — OXYCODONE HYDROCHLORIDE 5 MG/1
5 TABLET ORAL EVERY 4 HOURS PRN
Status: DISCONTINUED | OUTPATIENT
Start: 2025-05-17 | End: 2025-05-18

## 2025-05-17 RX ORDER — NALBUPHINE HYDROCHLORIDE 10 MG/ML
2.5 INJECTION INTRAMUSCULAR; INTRAVENOUS; SUBCUTANEOUS ONCE AS NEEDED
Status: DISCONTINUED | OUTPATIENT
Start: 2025-05-17 | End: 2025-05-18

## 2025-05-17 RX ORDER — METHYLERGONOVINE MALEATE 0.2 MG/ML
0.2 INJECTION INTRAVENOUS ONCE AS NEEDED
Status: DISCONTINUED | OUTPATIENT
Start: 2025-05-17 | End: 2025-05-21 | Stop reason: HOSPADM

## 2025-05-17 RX ORDER — ACETAMINOPHEN 650 MG/20.3ML
1000 LIQUID ORAL ONCE
Status: COMPLETED | OUTPATIENT
Start: 2025-05-17 | End: 2025-05-17

## 2025-05-17 RX ORDER — PROCHLORPERAZINE EDISYLATE 5 MG/ML
10 INJECTION INTRAMUSCULAR; INTRAVENOUS EVERY 6 HOURS PRN
Status: DISCONTINUED | OUTPATIENT
Start: 2025-05-17 | End: 2025-05-18

## 2025-05-17 RX ORDER — FENTANYL CITRATE 50 UG/ML
25 INJECTION, SOLUTION INTRAMUSCULAR; INTRAVENOUS EVERY 5 MIN PRN
Status: DISCONTINUED | OUTPATIENT
Start: 2025-05-17 | End: 2025-05-18

## 2025-05-17 RX ORDER — METOCLOPRAMIDE 10 MG/1
10 TABLET ORAL EVERY 6 HOURS PRN
Status: DISCONTINUED | OUTPATIENT
Start: 2025-05-17 | End: 2025-05-21 | Stop reason: HOSPADM

## 2025-05-17 RX ORDER — SODIUM CITRATE AND CITRIC ACID MONOHYDRATE 334; 500 MG/5ML; MG/5ML
30 SOLUTION ORAL ONCE
Status: COMPLETED | OUTPATIENT
Start: 2025-05-17 | End: 2025-05-17

## 2025-05-17 RX ORDER — ACETAMINOPHEN 325 MG/1
975 TABLET ORAL ONCE
Status: COMPLETED | OUTPATIENT
Start: 2025-05-17 | End: 2025-05-17

## 2025-05-17 RX ORDER — EPHEDRINE SULFATE/0.9% NACL/PF 50 MG/5 ML
SYRINGE (ML) INTRAVENOUS AS NEEDED
Status: DISCONTINUED | OUTPATIENT
Start: 2025-05-17 | End: 2025-05-17 | Stop reason: SURG

## 2025-05-17 RX ORDER — IBUPROFEN 600 MG/1
600 TABLET ORAL EVERY 6 HOURS PRN
Status: DISCONTINUED | OUTPATIENT
Start: 2025-05-19 | End: 2025-05-21 | Stop reason: HOSPADM

## 2025-05-17 RX ORDER — LIDOCAINE HCL/EPINEPHRINE/PF 2%-1:200K
VIAL (ML) INJECTION AS NEEDED
Status: DISCONTINUED | OUTPATIENT
Start: 2025-05-17 | End: 2025-05-17 | Stop reason: SURG

## 2025-05-17 RX ORDER — AMOXICILLIN 250 MG
1 CAPSULE ORAL 2 TIMES DAILY
Status: DISCONTINUED | OUTPATIENT
Start: 2025-05-17 | End: 2025-05-21 | Stop reason: HOSPADM

## 2025-05-17 RX ORDER — OXYCODONE HYDROCHLORIDE 5 MG/1
5 TABLET ORAL EVERY 4 HOURS PRN
Status: DISCONTINUED | OUTPATIENT
Start: 2025-05-17 | End: 2025-05-21 | Stop reason: HOSPADM

## 2025-05-17 RX ORDER — ONDANSETRON HYDROCHLORIDE 2 MG/ML
4 INJECTION, SOLUTION INTRAVENOUS EVERY 6 HOURS PRN
Status: DISCONTINUED | OUTPATIENT
Start: 2025-05-17 | End: 2025-05-18

## 2025-05-17 RX ORDER — OXYTOCIN/0.9 % SODIUM CHLORIDE 30/500 ML
83 PLASTIC BAG, INJECTION (ML) INTRAVENOUS CONTINUOUS
Status: ACTIVE | OUTPATIENT
Start: 2025-05-17 | End: 2025-05-17

## 2025-05-17 RX ORDER — ACETAMINOPHEN 650 MG/1
650 SUPPOSITORY RECTAL ONCE
Status: COMPLETED | OUTPATIENT
Start: 2025-05-17 | End: 2025-05-17

## 2025-05-17 RX ORDER — DIPHENHYDRAMINE HCL 50 MG/ML
25 VIAL (ML) INJECTION EVERY 6 HOURS PRN
Status: DISCONTINUED | OUTPATIENT
Start: 2025-05-17 | End: 2025-05-21 | Stop reason: HOSPADM

## 2025-05-17 RX ORDER — LABETALOL 100 MG/1
100 TABLET, FILM COATED ORAL
Status: DISCONTINUED | OUTPATIENT
Start: 2025-05-17 | End: 2025-05-19

## 2025-05-17 RX ORDER — PHENYLEPHRINE HCL IN 0.9% NACL 50MG/250ML
PLASTIC BAG, INJECTION (ML) INTRAVENOUS
Status: DISPENSED
Start: 2025-05-17 | End: 2025-05-18

## 2025-05-17 RX ORDER — ACETAMINOPHEN 325 MG/1
975 TABLET ORAL
Status: DISCONTINUED | OUTPATIENT
Start: 2025-05-17 | End: 2025-05-21 | Stop reason: HOSPADM

## 2025-05-17 RX ORDER — MORPHINE SULFATE 0.5 MG/ML
INJECTION, SOLUTION EPIDURAL; INTRATHECAL; INTRAVENOUS
Status: DISPENSED
Start: 2025-05-17 | End: 2025-05-17

## 2025-05-17 RX ORDER — PHENYLEPHRINE HCL IN 0.9% NACL 50MG/250ML
PLASTIC BAG, INJECTION (ML) INTRAVENOUS
Status: DISPENSED
Start: 2025-05-17 | End: 2025-05-17

## 2025-05-17 RX ORDER — ONDANSETRON 8 MG/1
8 TABLET, ORALLY DISINTEGRATING ORAL ONCE
Status: COMPLETED | OUTPATIENT
Start: 2025-05-17 | End: 2025-05-17

## 2025-05-17 RX ORDER — FERROUS SULFATE 325(65) MG
325 TABLET ORAL
Status: DISCONTINUED | OUTPATIENT
Start: 2025-05-17 | End: 2025-05-21 | Stop reason: HOSPADM

## 2025-05-17 RX ORDER — CARBOPROST TROMETHAMINE 250 UG/ML
250 INJECTION, SOLUTION INTRAMUSCULAR ONCE AS NEEDED
Status: DISCONTINUED | OUTPATIENT
Start: 2025-05-17 | End: 2025-05-21 | Stop reason: HOSPADM

## 2025-05-17 RX ORDER — SODIUM CHLORIDE, SODIUM LACTATE, POTASSIUM CHLORIDE, CALCIUM CHLORIDE 600; 310; 30; 20 MG/100ML; MG/100ML; MG/100ML; MG/100ML
150 INJECTION, SOLUTION INTRAVENOUS CONTINUOUS
Status: ACTIVE | OUTPATIENT
Start: 2025-05-17 | End: 2025-05-18

## 2025-05-17 RX ORDER — OXYTOCIN/0.9 % SODIUM CHLORIDE 30/500 ML
PLASTIC BAG, INJECTION (ML) INTRAVENOUS
Status: DISPENSED
Start: 2025-05-17 | End: 2025-05-18

## 2025-05-17 RX ORDER — KETOROLAC TROMETHAMINE 30 MG/ML
30 INJECTION, SOLUTION INTRAMUSCULAR; INTRAVENOUS
Status: COMPLETED | OUTPATIENT
Start: 2025-05-17 | End: 2025-05-19

## 2025-05-17 RX ORDER — MORPHINE SULFATE 0.5 MG/ML
INJECTION, SOLUTION EPIDURAL; INTRATHECAL; INTRAVENOUS AS NEEDED
Status: DISCONTINUED | OUTPATIENT
Start: 2025-05-17 | End: 2025-05-17 | Stop reason: SURG

## 2025-05-17 RX ORDER — DIPHENHYDRAMINE HCL 25 MG
25 CAPSULE ORAL EVERY 6 HOURS PRN
Status: DISCONTINUED | OUTPATIENT
Start: 2025-05-17 | End: 2025-05-21 | Stop reason: HOSPADM

## 2025-05-17 RX ORDER — IBUPROFEN 600 MG/1
600 TABLET ORAL
Status: COMPLETED | OUTPATIENT
Start: 2025-05-17 | End: 2025-05-19

## 2025-05-17 RX ORDER — OXYTOCIN/0.9 % SODIUM CHLORIDE 30/500 ML
667 PLASTIC BAG, INJECTION (ML) INTRAVENOUS ONCE
Status: DISPENSED | OUTPATIENT
Start: 2025-05-17 | End: 2025-05-17

## 2025-05-17 RX ADMIN — Medication 50 ML: at 05:55

## 2025-05-17 RX ADMIN — ACETAMINOPHEN 975 MG: 325 TABLET ORAL at 22:34

## 2025-05-17 RX ADMIN — ONDANSETRON 8 MG: 8 TABLET, ORALLY DISINTEGRATING ORAL at 10:36

## 2025-05-17 RX ADMIN — ACETAMINOPHEN 650 MG: 325 TABLET ORAL at 06:06

## 2025-05-17 RX ADMIN — ACETAMINOPHEN 975 MG: 325 TABLET ORAL at 10:36

## 2025-05-17 RX ADMIN — ACETAMINOPHEN 975 MG: 325 TABLET ORAL at 16:27

## 2025-05-17 RX ADMIN — CEFAZOLIN 2 G: 2 INJECTION, POWDER, FOR SOLUTION INTRAMUSCULAR; INTRAVENOUS at 10:36

## 2025-05-17 RX ADMIN — ONDANSETRON 4 MG: 2 INJECTION INTRAMUSCULAR; INTRAVENOUS at 03:33

## 2025-05-17 RX ADMIN — LIDOCAINE HYDROCHLORIDE AND EPINEPHRINE 5 ML: 20; 5 INJECTION, SOLUTION EPIDURAL; INFILTRATION; INTRACAUDAL; PERINEURAL at 11:06

## 2025-05-17 RX ADMIN — Medication 10 MG: at 11:22

## 2025-05-17 RX ADMIN — LIDOCAINE HYDROCHLORIDE AND EPINEPHRINE 3 ML: 20; 5 INJECTION, SOLUTION EPIDURAL; INFILTRATION; INTRACAUDAL; PERINEURAL at 11:37

## 2025-05-17 RX ADMIN — FERROUS SULFATE TAB 325 MG (65 MG ELEMENTAL FE) 325 MG: 325 (65 FE) TAB at 16:27

## 2025-05-17 RX ADMIN — Medication 50 ML: at 01:11

## 2025-05-17 RX ADMIN — SODIUM CITRATE AND CITRIC ACID MONOHYDRATE 30 ML: 500; 334 SOLUTION ORAL at 10:36

## 2025-05-17 RX ADMIN — FENTANYL CITRATE 25 MCG: 50 INJECTION INTRAMUSCULAR; INTRAVENOUS at 14:27

## 2025-05-17 RX ADMIN — Medication 667 ML/HR: at 11:52

## 2025-05-17 RX ADMIN — METOCLOPRAMIDE 10 MG: 5 INJECTION, SOLUTION INTRAMUSCULAR; INTRAVENOUS at 11:29

## 2025-05-17 RX ADMIN — LABETALOL HYDROCHLORIDE 200 MG: 200 TABLET ORAL at 06:07

## 2025-05-17 RX ADMIN — Medication 50 ML: at 10:40

## 2025-05-17 RX ADMIN — MORPHINE SULFATE 3 MG: 0.5 INJECTION, SOLUTION EPIDURAL; INTRATHECAL; INTRAVENOUS at 11:55

## 2025-05-17 RX ADMIN — LIDOCAINE HYDROCHLORIDE AND EPINEPHRINE 5 ML: 20; 5 INJECTION, SOLUTION EPIDURAL; INFILTRATION; INTRACAUDAL; PERINEURAL at 11:27

## 2025-05-17 RX ADMIN — PHENYLEPHRINE HYDROCHLORIDE 50 MCG/MIN: 10 INJECTION INTRAVENOUS at 11:24

## 2025-05-17 RX ADMIN — LABETALOL HYDROCHLORIDE 100 MG: 100 TABLET, FILM COATED ORAL at 18:10

## 2025-05-17 RX ADMIN — KETOROLAC TROMETHAMINE 30 MG: 30 INJECTION, SOLUTION INTRAMUSCULAR at 22:35

## 2025-05-17 RX ADMIN — SENNOSIDES AND DOCUSATE SODIUM 1 TABLET: 50; 8.6 TABLET ORAL at 19:58

## 2025-05-17 RX ADMIN — KETOROLAC TROMETHAMINE 30 MG: 30 INJECTION, SOLUTION INTRAMUSCULAR at 16:28

## 2025-05-17 RX ADMIN — LIDOCAINE HYDROCHLORIDE AND EPINEPHRINE 2 ML: 20; 5 INJECTION, SOLUTION EPIDURAL; INFILTRATION; INTRACAUDAL; PERINEURAL at 11:43

## 2025-05-17 RX ADMIN — SODIUM CHLORIDE, POTASSIUM CHLORIDE, SODIUM LACTATE AND CALCIUM CHLORIDE: 600; 310; 30; 20 INJECTION, SOLUTION INTRAVENOUS at 11:34

## 2025-05-17 RX ADMIN — AZITHROMYCIN DIHYDRATE 500 MG: 500 INJECTION, POWDER, LYOPHILIZED, FOR SOLUTION INTRAVENOUS at 11:26

## 2025-05-17 RX ADMIN — MAGNESIUM SULFATE HEPTAHYDRATE 2 G/HR: 40 INJECTION, SOLUTION INTRAVENOUS at 12:57

## 2025-05-18 LAB
ERYTHROCYTE [DISTWIDTH] IN BLOOD BY AUTOMATED COUNT: 13.3 % (ref 11.7–14.4)
GP B STREP DNA SPEC QL NAA+PROBE: NEGATIVE
HCT VFR BLD AUTO: 28.3 % (ref 35–45)
HGB BLD-MCNC: 8.9 G/DL (ref 11.8–15.7)
MAGNESIUM SERPL-MCNC: 5 MG/DL (ref 1.8–2.5)
MCH RBC QN AUTO: 28.7 PG (ref 28–33.2)
MCHC RBC AUTO-ENTMCNC: 31.4 G/DL (ref 32.2–35.5)
MCV RBC AUTO: 91.3 FL (ref 83–98)
PLATELET # BLD AUTO: 193 K/UL (ref 150–369)
PMV BLD AUTO: 12.2 FL (ref 9.4–12.3)
RBC # BLD AUTO: 3.1 M/UL (ref 3.93–5.22)
WBC # BLD AUTO: 10.26 K/UL (ref 3.8–10.5)

## 2025-05-18 PROCEDURE — 85027 COMPLETE CBC AUTOMATED: CPT | Performed by: OBSTETRICS & GYNECOLOGY

## 2025-05-18 PROCEDURE — 63600000 HC DRUGS/DETAIL CODE: Mod: JZ | Performed by: OBSTETRICS & GYNECOLOGY

## 2025-05-18 PROCEDURE — 63700000 HC SELF-ADMINISTRABLE DRUG: Performed by: OBSTETRICS & GYNECOLOGY

## 2025-05-18 PROCEDURE — 36415 COLL VENOUS BLD VENIPUNCTURE: CPT | Performed by: OBSTETRICS & GYNECOLOGY

## 2025-05-18 PROCEDURE — 83735 ASSAY OF MAGNESIUM: CPT | Performed by: OBSTETRICS & GYNECOLOGY

## 2025-05-18 PROCEDURE — 12000000 HC ROOM AND CARE MED/SURG

## 2025-05-18 RX ORDER — ALBUTEROL SULFATE 0.83 MG/ML
2.5 SOLUTION RESPIRATORY (INHALATION) EVERY 4 HOURS PRN
Status: DISCONTINUED | OUTPATIENT
Start: 2025-05-18 | End: 2025-05-21 | Stop reason: HOSPADM

## 2025-05-18 RX ORDER — ALBUTEROL SULFATE 90 UG/1
2 INHALANT RESPIRATORY (INHALATION) EVERY 4 HOURS PRN
Status: DISCONTINUED | OUTPATIENT
Start: 2025-05-18 | End: 2025-05-18

## 2025-05-18 RX ORDER — LABETALOL 100 MG/1
100 TABLET, FILM COATED ORAL 2 TIMES DAILY
Status: DISCONTINUED | OUTPATIENT
Start: 2025-05-18 | End: 2025-05-18 | Stop reason: SDUPTHER

## 2025-05-18 RX ADMIN — KETOROLAC TROMETHAMINE 30 MG: 30 INJECTION, SOLUTION INTRAMUSCULAR at 04:51

## 2025-05-18 RX ADMIN — LABETALOL HYDROCHLORIDE 100 MG: 100 TABLET, FILM COATED ORAL at 06:25

## 2025-05-18 RX ADMIN — SENNOSIDES AND DOCUSATE SODIUM 1 TABLET: 50; 8.6 TABLET ORAL at 10:51

## 2025-05-18 RX ADMIN — KETOROLAC TROMETHAMINE 30 MG: 30 INJECTION, SOLUTION INTRAMUSCULAR at 10:51

## 2025-05-18 RX ADMIN — FERROUS SULFATE TAB 325 MG (65 MG ELEMENTAL FE) 325 MG: 325 (65 FE) TAB at 10:51

## 2025-05-18 RX ADMIN — ACETAMINOPHEN 975 MG: 325 TABLET ORAL at 10:51

## 2025-05-18 RX ADMIN — ACETAMINOPHEN 975 MG: 325 TABLET ORAL at 23:03

## 2025-05-18 RX ADMIN — LABETALOL HYDROCHLORIDE 100 MG: 100 TABLET, FILM COATED ORAL at 18:04

## 2025-05-18 RX ADMIN — ACETAMINOPHEN 975 MG: 325 TABLET ORAL at 16:44

## 2025-05-18 RX ADMIN — FERROUS SULFATE TAB 325 MG (65 MG ELEMENTAL FE) 325 MG: 325 (65 FE) TAB at 16:46

## 2025-05-18 RX ADMIN — IBUPROFEN 600 MG: 600 TABLET, FILM COATED ORAL at 16:53

## 2025-05-18 RX ADMIN — IBUPROFEN 600 MG: 600 TABLET, FILM COATED ORAL at 23:04

## 2025-05-18 RX ADMIN — ACETAMINOPHEN 975 MG: 325 TABLET ORAL at 04:50

## 2025-05-19 ENCOUNTER — DOCUMENTATION (OUTPATIENT)
Dept: OBSTETRICS AND GYNECOLOGY | Facility: CLINIC | Age: 50
End: 2025-05-19

## 2025-05-19 DIAGNOSIS — O16.9 HYPERTENSION AFFECTING PREGNANCY, ANTEPARTUM: Primary | ICD-10-CM

## 2025-05-19 LAB
CREAT UR-MCNC: 24.4 MG/DL
PROT UR-MCNC: 8.8 MG/DL
PROT/CREAT UR: 361 MG/G CREAT (ref 0–200)

## 2025-05-19 PROCEDURE — 63700000 HC SELF-ADMINISTRABLE DRUG

## 2025-05-19 PROCEDURE — 63700000 HC SELF-ADMINISTRABLE DRUG: Performed by: OBSTETRICS & GYNECOLOGY

## 2025-05-19 PROCEDURE — 12000000 HC ROOM AND CARE MED/SURG

## 2025-05-19 PROCEDURE — 99252 IP/OBS CONSLTJ NEW/EST SF 35: CPT

## 2025-05-19 RX ORDER — LABETALOL 100 MG/1
100 TABLET, FILM COATED ORAL ONCE
Status: DISCONTINUED | OUTPATIENT
Start: 2025-05-19 | End: 2025-05-19

## 2025-05-19 RX ORDER — NIFEDIPINE 10 MG/1
10 CAPSULE ORAL ONCE
Status: COMPLETED | OUTPATIENT
Start: 2025-05-19 | End: 2025-05-19

## 2025-05-19 RX ORDER — VALACYCLOVIR HYDROCHLORIDE 500 MG/1
500 TABLET, FILM COATED ORAL EVERY 24 HOURS
Status: DISCONTINUED | OUTPATIENT
Start: 2025-05-19 | End: 2025-05-21 | Stop reason: HOSPADM

## 2025-05-19 RX ORDER — LABETALOL HCL 20 MG/4 ML
20 SYRINGE (ML) INTRAVENOUS ONCE
Status: DISCONTINUED | OUTPATIENT
Start: 2025-05-19 | End: 2025-05-19

## 2025-05-19 RX ORDER — LABETALOL 100 MG/1
100 TABLET, FILM COATED ORAL ONCE
Status: COMPLETED | OUTPATIENT
Start: 2025-05-19 | End: 2025-05-19

## 2025-05-19 RX ORDER — LABETALOL 200 MG/1
200 TABLET, FILM COATED ORAL
Status: DISCONTINUED | OUTPATIENT
Start: 2025-05-19 | End: 2025-05-20

## 2025-05-19 RX ORDER — FUROSEMIDE 20 MG/1
20 TABLET ORAL ONCE
Status: COMPLETED | OUTPATIENT
Start: 2025-05-19 | End: 2025-05-19

## 2025-05-19 RX ADMIN — PRENATAL VIT W/ FE FUMARATE-FA TAB 27-0.8 MG 1 TABLET: 27-0.8 TAB at 10:08

## 2025-05-19 RX ADMIN — LABETALOL HYDROCHLORIDE 200 MG: 200 TABLET ORAL at 18:32

## 2025-05-19 RX ADMIN — FUROSEMIDE 20 MG: 20 TABLET ORAL at 18:32

## 2025-05-19 RX ADMIN — IBUPROFEN 600 MG: 600 TABLET, FILM COATED ORAL at 18:31

## 2025-05-19 RX ADMIN — VALACYCLOVIR HYDROCHLORIDE 500 MG: 500 TABLET, FILM COATED ORAL at 18:32

## 2025-05-19 RX ADMIN — IBUPROFEN 600 MG: 600 TABLET, FILM COATED ORAL at 11:15

## 2025-05-19 RX ADMIN — ACETAMINOPHEN 975 MG: 325 TABLET ORAL at 11:15

## 2025-05-19 RX ADMIN — FERROUS SULFATE TAB 325 MG (65 MG ELEMENTAL FE) 325 MG: 325 (65 FE) TAB at 18:32

## 2025-05-19 RX ADMIN — ACETAMINOPHEN 975 MG: 325 TABLET ORAL at 18:31

## 2025-05-19 RX ADMIN — LABETALOL HYDROCHLORIDE 100 MG: 100 TABLET, FILM COATED ORAL at 06:13

## 2025-05-19 RX ADMIN — LABETALOL HYDROCHLORIDE 100 MG: 100 TABLET, FILM COATED ORAL at 12:58

## 2025-05-19 RX ADMIN — NIFEDIPINE 10 MG: 10 CAPSULE ORAL at 11:17

## 2025-05-19 RX ADMIN — IBUPROFEN 600 MG: 600 TABLET, FILM COATED ORAL at 06:11

## 2025-05-19 RX ADMIN — SENNOSIDES AND DOCUSATE SODIUM 1 TABLET: 50; 8.6 TABLET ORAL at 10:08

## 2025-05-19 RX ADMIN — ACETAMINOPHEN 975 MG: 325 TABLET ORAL at 06:11

## 2025-05-19 RX ADMIN — FERROUS SULFATE TAB 325 MG (65 MG ELEMENTAL FE) 325 MG: 325 (65 FE) TAB at 10:08

## 2025-05-20 ENCOUNTER — DOCUMENTATION (OUTPATIENT)
Dept: OBSTETRICS AND GYNECOLOGY | Facility: CLINIC | Age: 50
End: 2025-05-20
Payer: COMMERCIAL

## 2025-05-20 LAB
CASE RPRT: NORMAL
PATH REPORT.FINAL DX SPEC: NORMAL
PATH REPORT.GROSS SPEC: NORMAL

## 2025-05-20 PROCEDURE — 63700000 HC SELF-ADMINISTRABLE DRUG: Performed by: OBSTETRICS & GYNECOLOGY

## 2025-05-20 PROCEDURE — 99231 SBSQ HOSP IP/OBS SF/LOW 25: CPT

## 2025-05-20 PROCEDURE — 63700000 HC SELF-ADMINISTRABLE DRUG

## 2025-05-20 PROCEDURE — 12000000 HC ROOM AND CARE MED/SURG

## 2025-05-20 RX ORDER — NIFEDIPINE 10 MG/1
10 CAPSULE ORAL ONCE
Status: COMPLETED | OUTPATIENT
Start: 2025-05-20 | End: 2025-05-20

## 2025-05-20 RX ORDER — NIFEDIPINE 30 MG/1
30 TABLET, EXTENDED RELEASE ORAL DAILY
Status: DISCONTINUED | OUTPATIENT
Start: 2025-05-20 | End: 2025-05-21 | Stop reason: HOSPADM

## 2025-05-20 RX ORDER — LABETALOL 200 MG/1
200 TABLET, FILM COATED ORAL EVERY 8 HOURS
Status: DISCONTINUED | OUTPATIENT
Start: 2025-05-20 | End: 2025-05-21

## 2025-05-20 RX ORDER — NIFEDIPINE 10 MG/1
10 CAPSULE ORAL ONCE
Status: DISCONTINUED | OUTPATIENT
Start: 2025-05-20 | End: 2025-05-20

## 2025-05-20 RX ORDER — FUROSEMIDE 20 MG/1
20 TABLET ORAL DAILY
Status: DISCONTINUED | OUTPATIENT
Start: 2025-05-20 | End: 2025-05-21 | Stop reason: HOSPADM

## 2025-05-20 RX ADMIN — PRENATAL VIT W/ FE FUMARATE-FA TAB 27-0.8 MG 1 TABLET: 27-0.8 TAB at 08:01

## 2025-05-20 RX ADMIN — FERROUS SULFATE TAB 325 MG (65 MG ELEMENTAL FE) 325 MG: 325 (65 FE) TAB at 08:01

## 2025-05-20 RX ADMIN — NIFEDIPINE 30 MG: 30 TABLET, EXTENDED RELEASE ORAL at 09:29

## 2025-05-20 RX ADMIN — ACETAMINOPHEN 975 MG: 325 TABLET ORAL at 00:44

## 2025-05-20 RX ADMIN — IBUPROFEN 600 MG: 600 TABLET, FILM COATED ORAL at 23:44

## 2025-05-20 RX ADMIN — FUROSEMIDE 20 MG: 20 TABLET ORAL at 10:18

## 2025-05-20 RX ADMIN — LABETALOL HYDROCHLORIDE 200 MG: 200 TABLET ORAL at 21:34

## 2025-05-20 RX ADMIN — SENNOSIDES AND DOCUSATE SODIUM 1 TABLET: 50; 8.6 TABLET ORAL at 21:34

## 2025-05-20 RX ADMIN — ACETAMINOPHEN 975 MG: 325 TABLET ORAL at 23:44

## 2025-05-20 RX ADMIN — LABETALOL HYDROCHLORIDE 200 MG: 200 TABLET ORAL at 06:17

## 2025-05-20 RX ADMIN — IBUPROFEN 600 MG: 600 TABLET, FILM COATED ORAL at 17:53

## 2025-05-20 RX ADMIN — NIFEDIPINE 10 MG: 10 CAPSULE ORAL at 12:42

## 2025-05-20 RX ADMIN — VALACYCLOVIR HYDROCHLORIDE 500 MG: 500 TABLET, FILM COATED ORAL at 08:01

## 2025-05-20 RX ADMIN — ACETAMINOPHEN 975 MG: 325 TABLET ORAL at 17:54

## 2025-05-20 RX ADMIN — IBUPROFEN 600 MG: 600 TABLET, FILM COATED ORAL at 00:44

## 2025-05-20 RX ADMIN — NIFEDIPINE 10 MG: 10 CAPSULE ORAL at 08:35

## 2025-05-20 RX ADMIN — IBUPROFEN 600 MG: 600 TABLET, FILM COATED ORAL at 12:03

## 2025-05-20 RX ADMIN — IBUPROFEN 600 MG: 600 TABLET, FILM COATED ORAL at 06:17

## 2025-05-20 RX ADMIN — NIFEDIPINE 10 MG: 10 CAPSULE ORAL at 13:33

## 2025-05-20 RX ADMIN — SENNOSIDES AND DOCUSATE SODIUM 1 TABLET: 50; 8.6 TABLET ORAL at 08:01

## 2025-05-20 RX ADMIN — ACETAMINOPHEN 975 MG: 325 TABLET ORAL at 06:17

## 2025-05-20 RX ADMIN — ACETAMINOPHEN 975 MG: 325 TABLET ORAL at 12:03

## 2025-05-20 RX ADMIN — LABETALOL HYDROCHLORIDE 200 MG: 200 TABLET ORAL at 13:33

## 2025-05-20 RX ADMIN — SENNOSIDES AND DOCUSATE SODIUM 1 TABLET: 50; 8.6 TABLET ORAL at 00:44

## 2025-05-21 VITALS
DIASTOLIC BLOOD PRESSURE: 86 MMHG | SYSTOLIC BLOOD PRESSURE: 146 MMHG | HEIGHT: 61 IN | BODY MASS INDEX: 38.71 KG/M2 | TEMPERATURE: 98.2 F | RESPIRATION RATE: 20 BRPM | WEIGHT: 205 LBS | HEART RATE: 100 BPM | OXYGEN SATURATION: 97 %

## 2025-05-21 PROCEDURE — 63700000 HC SELF-ADMINISTRABLE DRUG: Performed by: OBSTETRICS & GYNECOLOGY

## 2025-05-21 PROCEDURE — 63700000 HC SELF-ADMINISTRABLE DRUG

## 2025-05-21 PROCEDURE — 99231 SBSQ HOSP IP/OBS SF/LOW 25: CPT

## 2025-05-21 RX ORDER — LABETALOL 200 MG/1
400 TABLET, FILM COATED ORAL EVERY 8 HOURS
Status: DISCONTINUED | OUTPATIENT
Start: 2025-05-21 | End: 2025-05-21 | Stop reason: HOSPADM

## 2025-05-21 RX ORDER — NIFEDIPINE 30 MG/1
30 TABLET, EXTENDED RELEASE ORAL DAILY
Qty: 30 TABLET | Refills: 0 | Status: SHIPPED | OUTPATIENT
Start: 2025-05-22 | End: 2025-06-02

## 2025-05-21 RX ORDER — FUROSEMIDE 20 MG/1
20 TABLET ORAL DAILY
Qty: 5 TABLET | Refills: 1 | Status: SHIPPED | OUTPATIENT
Start: 2025-05-21 | End: 2025-06-02

## 2025-05-21 RX ORDER — LABETALOL HYDROCHLORIDE 400 MG/1
400 TABLET, FILM COATED ORAL EVERY 8 HOURS
Qty: 90 TABLET | Refills: 0 | Status: SHIPPED | OUTPATIENT
Start: 2025-05-21 | End: 2025-06-02

## 2025-05-21 RX ORDER — LABETALOL 200 MG/1
200 TABLET, FILM COATED ORAL ONCE
Status: COMPLETED | OUTPATIENT
Start: 2025-05-21 | End: 2025-05-21

## 2025-05-21 RX ORDER — LABETALOL 100 MG/1
200 TABLET, FILM COATED ORAL 3 TIMES DAILY
Qty: 180 TABLET | Refills: 0 | Status: SHIPPED | OUTPATIENT
Start: 2025-05-21 | End: 2025-05-23 | Stop reason: DRUGHIGH

## 2025-05-21 RX ADMIN — SENNOSIDES AND DOCUSATE SODIUM 1 TABLET: 50; 8.6 TABLET ORAL at 08:19

## 2025-05-21 RX ADMIN — ACETAMINOPHEN 975 MG: 325 TABLET ORAL at 11:54

## 2025-05-21 RX ADMIN — IBUPROFEN 600 MG: 600 TABLET, FILM COATED ORAL at 11:55

## 2025-05-21 RX ADMIN — LABETALOL HYDROCHLORIDE 200 MG: 200 TABLET ORAL at 05:40

## 2025-05-21 RX ADMIN — NIFEDIPINE 30 MG: 30 TABLET, EXTENDED RELEASE ORAL at 08:18

## 2025-05-21 RX ADMIN — VALACYCLOVIR HYDROCHLORIDE 500 MG: 500 TABLET, FILM COATED ORAL at 08:19

## 2025-05-21 RX ADMIN — FUROSEMIDE 20 MG: 20 TABLET ORAL at 08:19

## 2025-05-21 RX ADMIN — ACETAMINOPHEN 975 MG: 325 TABLET ORAL at 05:39

## 2025-05-21 RX ADMIN — IBUPROFEN 600 MG: 600 TABLET, FILM COATED ORAL at 05:39

## 2025-05-21 RX ADMIN — PRENATAL VIT W/ FE FUMARATE-FA TAB 27-0.8 MG 1 TABLET: 27-0.8 TAB at 08:19

## 2025-05-21 RX ADMIN — FERROUS SULFATE TAB 325 MG (65 MG ELEMENTAL FE) 325 MG: 325 (65 FE) TAB at 08:19

## 2025-05-21 RX ADMIN — LABETALOL HYDROCHLORIDE 200 MG: 200 TABLET ORAL at 10:07

## 2025-05-21 RX ADMIN — LABETALOL HYDROCHLORIDE 400 MG: 200 TABLET ORAL at 14:06

## 2025-05-22 ENCOUNTER — TELEPHONE (OUTPATIENT)
Dept: CARDIOLOGY | Facility: CLINIC | Age: 50
End: 2025-05-22
Payer: COMMERCIAL

## 2025-05-30 ENCOUNTER — DOCUMENTATION (OUTPATIENT)
Dept: CARDIOLOGY | Facility: CLINIC | Age: 50
End: 2025-05-30
Payer: COMMERCIAL

## 2025-06-02 ENCOUNTER — OFFICE VISIT (OUTPATIENT)
Dept: CARDIOLOGY | Facility: CLINIC | Age: 50
End: 2025-06-02
Payer: COMMERCIAL

## 2025-06-02 VITALS
BODY MASS INDEX: 35.42 KG/M2 | RESPIRATION RATE: 16 BRPM | WEIGHT: 187.6 LBS | HEART RATE: 61 BPM | HEIGHT: 61 IN | DIASTOLIC BLOOD PRESSURE: 82 MMHG | SYSTOLIC BLOOD PRESSURE: 132 MMHG | OXYGEN SATURATION: 99 %

## 2025-06-02 DIAGNOSIS — Z86.32 HISTORY OF GESTATIONAL DIABETES: ICD-10-CM

## 2025-06-02 PROBLEM — O16.9 HYPERTENSION AFFECTING PREGNANCY: Status: RESOLVED | Noted: 2020-11-20 | Resolved: 2025-06-02

## 2025-06-02 PROBLEM — O13.3 GESTATIONAL HYPERTENSION, THIRD TRIMESTER: Status: RESOLVED | Noted: 2025-05-16 | Resolved: 2025-06-02

## 2025-06-02 PROCEDURE — 3075F SYST BP GE 130 - 139MM HG: CPT | Performed by: STUDENT IN AN ORGANIZED HEALTH CARE EDUCATION/TRAINING PROGRAM

## 2025-06-02 PROCEDURE — 3079F DIAST BP 80-89 MM HG: CPT | Performed by: STUDENT IN AN ORGANIZED HEALTH CARE EDUCATION/TRAINING PROGRAM

## 2025-06-02 PROCEDURE — 99214 OFFICE O/P EST MOD 30 MIN: CPT | Performed by: STUDENT IN AN ORGANIZED HEALTH CARE EDUCATION/TRAINING PROGRAM

## 2025-06-02 RX ORDER — LABETALOL HYDROCHLORIDE 400 MG/1
200 TABLET, FILM COATED ORAL EVERY 8 HOURS
COMMUNITY
Start: 2025-06-02

## 2025-06-02 RX ORDER — VALACYCLOVIR HYDROCHLORIDE 500 MG/1
500 TABLET, FILM COATED ORAL DAILY
COMMUNITY

## 2025-06-02 NOTE — ASSESSMENT & PLAN NOTE
Hide either suspected chronic hypertension versus gestational hypertension with this pregnancy.  Developed preeclampsia in the postpartum period.  Blood pressure on exam today except measuring 132/82 mmHg.  She is due for her afternoon dose of labetalol.  --Decrease labetalol to 200 mg every 8 hours  --Preeclampsia is a sex-specific cardiovascular risk factor and is associated with premature cardiovascular disease (CVD) including stroke, myocardial infarction, and heart failure with preserved ejection fraction.  --Reviewed that women with a history of adverse pregnancy outcome (APO), including hypertensive disorders of pregnancy (preeclampsia and gestational hypertension), gestational diabetes,  birth, and small for gestational age infant are at increased risk of future cardiovascular events and risk factor development.   --Plan for aggressive lifelong risk factor optimization: Lifestyle modification discussed, including the importance of heart healthy diet and regular aerobic exercise. Ensure adequate blood pressure control with goal less than 130/80 mmHg. Plan for annual fasting glucose and lipid monitoring with target LDL less than 100.  --Consider coronary calcium score around age 40-45.

## 2025-06-02 NOTE — PROGRESS NOTES
Candelaria Draper,   Cardiology    [facility]  [facility]  [address]  [address], [address]  [place] [address]    [facility]  [address]  [facility] [place] [address]    [phone]    [facility].org/[facility]     2025     Reason for visit: Cardiovascular follow-up    Roxann Silva is a 49 y.o. female who presents for cardiovascular follow-up.  She has a history of preeclampsia with possible chronic hypertension with her 2 prior pregnancies.  Her prior pregnancies were also complicated by gestational diabetes. I last saw her in the office in 2025 at which time she was 30 weeks pregnant.  Blood pressure was top normal and I recommended adding labetalol 100 mg twice daily.  Interval history reviewed.  She presented to the hospital at 36 weeks and 1 day on May 16 with elevated blood pressure readings in the office.  The following day she underwent a  section for breech presentation with delivery of her son.  Cardiology was consulted on the  for positive preeclampsia blood work and intermittent mild to severe range blood pressure readings.  Her labetalol was uptitrated and nifedipine was added.  She was given a 5 days of Lasix.  She was enrolled in the postpartum blood pressure monitoring program after discharge.  Blood pressures had been low normal and her nifedipine was discontinued    Roxann has overall been feeling better since leaving the hospital. Blood pressures at home have been varied, she has not been great taking her Labetalol at the same time every day. Blood pressures have ranged from 120-140/80-90s mmHg. Today she has taken 1 dose of Labetalol and is due for her 2nd dose around 2PM.     Past Medical History:  1. Preeclampsia with suspected chronic hypertension  2. Gestational diabetes, first two pregnancies   3. Asthma   4. IVF  5. Obstetrics history: -2-0-2    Past Surgical History:  1.  [place] tooth extraction  2.   Left tendon release    Medications:  Current Outpatient Medications   Medication Sig Dispense Refill    albuterol HFA 90 mcg/actuation inhaler Inhale 2 puffs every 4 (four) hours as needed.      fluticasone furoate-vilanteroL (BREO ELLIPTA) 100-25 mcg/dose powder for inhalation INHALE 1 PUFF BY MOUTH EVERY DAY      labetaloL 400 mg tablet Take 200 mg by mouth every 8 (eight) hours.      prenatal no115/iron/folic acid (PRENATAL 19 ORAL) Take by mouth See admin instr.      valACYclovir (VALTREX) 500 mg tablet Take 500 mg by mouth daily.       No current facility-administered medications for this visit.       Allergies: Tree nuts    Social History: Never smoker.  Denies alcohol use.  Denies illicit drug use    Family History: Reviewed and notable for overall unknown but father's side does have heart issues including coronary artery disease. Father with hyperlipidemia.       Exam:  Objective   Vitals:    06/02/25 1318   BP: 132/82   Pulse: 61   Resp: 16   SpO2: 99%       Body mass index is 35.45 kg/m².  Physical Exam  Constitutional:       Appearance: Normal appearance.   HENT:      Head: Normocephalic and atraumatic.   Eyes:      General: No scleral icterus.  Neck:      Vascular: No JVD.   Cardiovascular:      Rate and Rhythm: Normal rate and regular rhythm.      Heart sounds: No murmur heard.  Pulmonary:      Effort: Pulmonary effort is normal. No respiratory distress.      Breath sounds: Normal breath sounds. No wheezing, rhonchi or rales.   Abdominal:      General: There is no distension.      Palpations: Abdomen is soft.   Musculoskeletal:      Right lower leg: No edema.      Left lower leg: No edema.   Skin:     General: Skin is warm and dry.   Neurological:      Mental Status: She is alert and oriented to person, place, and time.   Psychiatric:         Mood and Affect: Mood normal.         Behavior: Behavior normal.         Labs:  Lab Results   Component Value Date    WBC 10.26 05/18/2025    HGB 8.9 (L)  2025     2025    ALT 13 2025    AST 22 2025     2025    K 3.8 2025    CREATININE 0.6 2025    HGBA1C 5.4 2024     Personally reviewed, my interpretation: normal renal function     Cardiovascular Studies: None    Assessment/Plan   Problem List Items Addressed This Visit       History of gestational diabetes    With first 2 pregnancies.  This pregnancy no gestational diabetes  --Recommend yearly fasting glucose and hemoglobin A1c         Preeclampsia in postpartum period - Primary    Hide either suspected chronic hypertension versus gestational hypertension with this pregnancy.  Developed preeclampsia in the postpartum period.  Blood pressure on exam today except measuring 132/82 mmHg.  She is due for her afternoon dose of labetalol.  --Decrease labetalol to 200 mg every 8 hours  --Preeclampsia is a sex-specific cardiovascular risk factor and is associated with premature cardiovascular disease (CVD) including stroke, myocardial infarction, and heart failure with preserved ejection fraction.  --Reviewed that women with a history of adverse pregnancy outcome (APO), including hypertensive disorders of pregnancy (preeclampsia and gestational hypertension), gestational diabetes,  birth, and small for gestational age infant are at increased risk of future cardiovascular events and risk factor development.   --Plan for aggressive lifelong risk factor optimization: Lifestyle modification discussed, including the importance of heart healthy diet and regular aerobic exercise. Ensure adequate blood pressure control with goal less than 130/80 mmHg. Plan for annual fasting glucose and lipid monitoring with target LDL less than 100.  --Consider coronary calcium score around age 40-45.          Relevant Medications    labetaloL 400 mg tablet          Candelaria Draper, DO    This letter was generated using speech recognition software. Please excuse any typographical  errors.    Return in about 16 days (around 6/18/2025).

## 2025-06-09 ENCOUNTER — OFFICE VISIT (OUTPATIENT)
Dept: OBSTETRICS AND GYNECOLOGY | Facility: CLINIC | Age: 50
End: 2025-06-09
Payer: COMMERCIAL

## 2025-06-09 VITALS — DIASTOLIC BLOOD PRESSURE: 80 MMHG | SYSTOLIC BLOOD PRESSURE: 120 MMHG | HEIGHT: 61 IN | BODY MASS INDEX: 35.45 KG/M2

## 2025-06-09 DIAGNOSIS — Z98.890 POST-OPERATIVE STATE: Primary | ICD-10-CM

## 2025-06-09 PROCEDURE — 99024 POSTOP FOLLOW-UP VISIT: CPT | Performed by: OBSTETRICS & GYNECOLOGY

## 2025-06-09 NOTE — PROGRESS NOTES
48 yo W female  here for post op C/S wound check.  Pt had C/S for tranverse lie and chronic HTN with superimposed preeclampsia.  Initially on lasix, Procardia and labetalol, no only on Labetalol 200mg TID.  Follows with cardiology has visit in 2 weeks.Feels well no pain. Does not need contraception as she had donor egg.    /88    Incision well healed    A. PO C/S 3 weeks  Severe preeclampsia  Poss chronic HTN    RTO 3 weks  Increase activity   Follow up with cardiology

## 2025-06-18 ENCOUNTER — OFFICE VISIT (OUTPATIENT)
Dept: CARDIOLOGY | Facility: CLINIC | Age: 50
End: 2025-06-18
Payer: COMMERCIAL

## 2025-06-18 VITALS
SYSTOLIC BLOOD PRESSURE: 138 MMHG | HEART RATE: 70 BPM | OXYGEN SATURATION: 99 % | WEIGHT: 188 LBS | HEIGHT: 61 IN | BODY MASS INDEX: 35.5 KG/M2 | DIASTOLIC BLOOD PRESSURE: 80 MMHG

## 2025-06-18 DIAGNOSIS — Z86.32 HISTORY OF GESTATIONAL DIABETES: Primary | ICD-10-CM

## 2025-06-18 PROCEDURE — 3075F SYST BP GE 130 - 139MM HG: CPT | Performed by: STUDENT IN AN ORGANIZED HEALTH CARE EDUCATION/TRAINING PROGRAM

## 2025-06-18 PROCEDURE — 3079F DIAST BP 80-89 MM HG: CPT | Performed by: STUDENT IN AN ORGANIZED HEALTH CARE EDUCATION/TRAINING PROGRAM

## 2025-06-18 PROCEDURE — 3008F BODY MASS INDEX DOCD: CPT | Performed by: STUDENT IN AN ORGANIZED HEALTH CARE EDUCATION/TRAINING PROGRAM

## 2025-06-18 PROCEDURE — 99213 OFFICE O/P EST LOW 20 MIN: CPT | Performed by: STUDENT IN AN ORGANIZED HEALTH CARE EDUCATION/TRAINING PROGRAM

## 2025-06-18 NOTE — ASSESSMENT & PLAN NOTE
Hide either suspected chronic hypertension versus gestational hypertension with this pregnancy.  Developed preeclampsia in the postpartum period.  Blood pressure on exam today except measuring 138/80 mmHg.    -- Continue labetalol 200 mg every 8 hours  --Preeclampsia is a sex-specific cardiovascular risk factor and is associated with premature cardiovascular disease (CVD) including stroke, myocardial infarction, and heart failure with preserved ejection fraction.  --Reviewed that women with a history of adverse pregnancy outcome (APO), including hypertensive disorders of pregnancy (preeclampsia and gestational hypertension), gestational diabetes,  birth, and small for gestational age infant are at increased risk of future cardiovascular events and risk factor development.   --Plan for aggressive lifelong risk factor optimization: Lifestyle modification discussed, including the importance of heart healthy diet and regular aerobic exercise. Ensure adequate blood pressure control with goal less than 130/80 mmHg. Plan for annual fasting glucose and lipid monitoring with target LDL less than 100.  --Consider coronary calcium score around age 40-45.

## 2025-06-18 NOTE — PROGRESS NOTES
Candelaria Draper,   Cardiology    [facility] HEART GROUP  [facility]  [address]  [address]  [place] [address]    [facility]  [address] [facility] [place] [address]    [phone]    [facility]     2025     Reason for visit: Cardiovascular follow-up    Roxann Silva is a 49 y.o. female who presents for cardiovascular follow-up.  She has a history of preeclampsia with possible chronic hypertension with her 3 prior pregnancies.  Her prior pregnancies were also complicated by gestational diabetes. Last saw her in the office on  at which time she was overall feeling well.  Blood pressure was acceptable and I recommended decreasing her labetalol to 200 mg every 8 hours.    Roxann is about 4 weeks post partum. She has overall been feeling better since leaving the hospital. Blood pressures have ranged from 117/73 to 142/89 mmHg at least 1 hour after a dose of Labetalol .     Past Medical History:  1. Preeclampsia with suspected chronic hypertension  2. Gestational diabetes, first two pregnancies   3. Asthma   4. IVF  5. Obstetrics history: -2-0-2    Past Surgical History:  1.  [place] tooth extraction  2.  Left tendon release    Medications:  Current Outpatient Medications   Medication Sig Dispense Refill    albuterol HFA 90 mcg/actuation inhaler Inhale 2 puffs every 4 (four) hours as needed.      labetaloL 400 mg tablet Take 200 mg by mouth every 8 (eight) hours.      valACYclovir (VALTREX) 500 mg tablet Take 500 mg by mouth daily.       No current facility-administered medications for this visit.       Allergies: Tree nuts    Social History: Never smoker.  Denies alcohol use.  Denies illicit drug use    Family History: Reviewed and notable for overall unknown but father's side does have heart issues including coronary artery disease. Father with hyperlipidemia.       Exam:  Objective   Vitals:    25 1017   BP: 138/80   Pulse:  70   SpO2: 99%         Body mass index is 35.52 kg/m².  Physical Exam  Constitutional:       Appearance: Normal appearance.   HENT:      Head: Normocephalic and atraumatic.   Eyes:      General: No scleral icterus.  Neck:      Vascular: No JVD.   Cardiovascular:      Rate and Rhythm: Normal rate and regular rhythm.      Heart sounds: No murmur heard.  Pulmonary:      Effort: Pulmonary effort is normal. No respiratory distress.      Breath sounds: Normal breath sounds. No wheezing, rhonchi or rales.   Abdominal:      General: There is no distension.      Palpations: Abdomen is soft.   Musculoskeletal:      Right lower leg: No edema.      Left lower leg: No edema.   Skin:     General: Skin is warm and dry.   Neurological:      Mental Status: She is alert and oriented to person, place, and time.   Psychiatric:         Mood and Affect: Mood normal.         Behavior: Behavior normal.         Labs:  Lab Results   Component Value Date    WBC 10.26 05/18/2025    HGB 8.9 (L) 05/18/2025     05/18/2025    ALT 13 05/16/2025    AST 22 05/16/2025     05/16/2025    K 3.8 05/16/2025    CREATININE 0.6 05/16/2025    HGBA1C 5.4 11/21/2024     Personally reviewed, my interpretation: normal renal function     Cardiovascular Studies: None    Assessment/Plan   Problem List Items Addressed This Visit       History of gestational diabetes - Primary    With first 2 pregnancies.  This pregnancy no gestational diabetes  --Recommend yearly fasting glucose and hemoglobin A1c         Preeclampsia in postpartum period    Hide either suspected chronic hypertension versus gestational hypertension with this pregnancy.  Developed preeclampsia in the postpartum period.  Blood pressure on exam today except measuring 138/80 mmHg.    -- Continue labetalol 200 mg every 8 hours  --Preeclampsia is a sex-specific cardiovascular risk factor and is associated with premature cardiovascular disease (CVD) including stroke, myocardial infarction, and  heart failure with preserved ejection fraction.  --Reviewed that women with a history of adverse pregnancy outcome (APO), including hypertensive disorders of pregnancy (preeclampsia and gestational hypertension), gestational diabetes,  birth, and small for gestational age infant are at increased risk of future cardiovascular events and risk factor development.   --Plan for aggressive lifelong risk factor optimization: Lifestyle modification discussed, including the importance of heart healthy diet and regular aerobic exercise. Ensure adequate blood pressure control with goal less than 130/80 mmHg. Plan for annual fasting glucose and lipid monitoring with target LDL less than 100.  --Consider coronary calcium score around age 40-45.                  Candelaria Draper, DO    This letter was generated using speech recognition software. Please excuse any typographical errors.    Return in about 8 weeks (around 2025).

## 2025-06-30 ENCOUNTER — OFFICE VISIT (OUTPATIENT)
Dept: OBSTETRICS AND GYNECOLOGY | Facility: CLINIC | Age: 50
End: 2025-06-30
Payer: COMMERCIAL

## 2025-06-30 VITALS — SYSTOLIC BLOOD PRESSURE: 122 MMHG | WEIGHT: 190 LBS | BODY MASS INDEX: 35.9 KG/M2 | DIASTOLIC BLOOD PRESSURE: 80 MMHG

## 2025-06-30 DIAGNOSIS — Z12.4 SCREENING FOR MALIGNANT NEOPLASM OF CERVIX: ICD-10-CM

## 2025-06-30 DIAGNOSIS — Z12.31 ENCOUNTER FOR SCREENING MAMMOGRAM FOR BREAST CANCER: ICD-10-CM

## 2025-06-30 DIAGNOSIS — N32.81 OVERACTIVE BLADDER: ICD-10-CM

## 2025-06-30 PROCEDURE — 0503F POSTPARTUM CARE VISIT: CPT | Performed by: ADVANCED PRACTICE MIDWIFE

## 2025-06-30 NOTE — PROGRESS NOTES
Postpartum Visit  Subjective:  Here for postpartum appointment without concerns. Denies issues with breast, bowels, bladder, and bottom.  Reports no issues with leaking urine.  Has not resumed vaginal intercourse.  Reports mood feels good.  Reports bleeding stopped at 4 weeks.  Breast and formula feeding baby. Mostly breastfeeding.     Hx of overactive bladder.  Questions about taking vesicare, which was previously being used as treatment while breastfeeding.  Desires referral for URO/GYN    Safety: Feels safe at home.  Denies issues with domestic violence.     GYN screening history: Last Pap: Reports pap done in the last 5 years.  Will send results.  3/21/19: NILM/HR HPV neg. Last Mammogram: 2019.    Review of Systems: Pertinent items are noted in HPI.    Allergies: Tree nuts     Current Outpatient Medications:     albuterol HFA 90 mcg/actuation inhaler, Inhale 2 puffs every 4 (four) hours as needed., Disp: , Rfl:     labetaloL 400 mg tablet, Take 200 mg by mouth every 8 (eight) hours., Disp: , Rfl:     valACYclovir (VALTREX) 500 mg tablet, Take 500 mg by mouth daily., Disp: , Rfl:   Past Medical History:   Diagnosis Date    Asthma     Carpal tunnel syndrome     Female infertility     donor egg, IVF this pregnancy    Hearing loss     bilateral hearing loss, hearing aides at age 5    Herpes     Hypertension     Migraines      Past Surgical History   Procedure Laterality Date     SECTION N/A 2025    Performed by Wilda Prajapati DO at [facility] L[facility] OR    Mouth surgery      Tendon release Left     [place] tooth extraction       OB History    Para Term  AB Living   4 3  3 1 3   SAB IAB Ectopic Multiple Live Births     1 0 3      # Outcome Date GA Lbr Darryl/2nd Weight Sex Type Anes PTL Lv   4  25 36w2d  2784 g (6 lb 2.2 oz) M CS-LTranv EPI N SAUL      Complications: Preeclampsia   3  20 36w1d 07:00 / 00:19 2240 g (4 lb 15 oz) F Vag-Spont EPI  SAUL      Complications:  Pre-eclampsia   2  03//18 36w4d 03:40 / 01:18 2750 g (6 lb 1 oz) F Vag-Spont EPI N SAUL   1 Ectopic               No family history on file.   Social History     Socioeconomic History    Marital status:      Spouse name: marv neal    Number of children: None    Years of education: None    Highest education level: None   Occupational History    Occupation: nurse   Tobacco Use    Smoking status: Never    Smokeless tobacco: Never   Vaping Use    Vaping status: Never Used   Substance and Sexual Activity    Alcohol use: Not Currently    Drug use: Never    Sexual activity: Yes     Partners: Male     Social Drivers of Health     Food Insecurity: No Food Insecurity (2025)    Hunger Vital Sign     Worried About Running Out of Food in the Last Year: Never true     Ran Out of Food in the Last Year: Never true   Transportation Needs: No Transportation Needs (2025)    PRAPARE - Transportation     Lack of Transportation (Medical): No     Lack of Transportation (Non-Medical): No   Housing Stability: Low Risk  (2025)    Housing Stability Vital Sign     Unable to Pay for Housing in the Last Year: No     Number of Times Moved in the Last Year: 0     Homeless in the Last Year: No     Objective:  BP: 122/80  Weight: 86.2 kg (190 lb) ( 1018)     Physical Exam  Constitutional:       General: She is awake.      Appearance: Normal appearance.   Genitourinary:      Vulva normal.      No lesions in the vagina.      No vaginal discharge or erythema.      No vaginal prolapse present.       Right Adnexa: no mass present.     Left Adnexa: no mass present.     No cervical discharge.      Uterus is not enlarged or tender.   Breasts:     Breasts are symmetrical.      Breasts are soft.     Right: Normal. No mass, nipple discharge, skin change or tenderness.      Left: Normal. No mass, nipple discharge, skin change or tenderness.      Breast exam comments: Lactating.  HENT:      Head: Normocephalic and  atraumatic.      Nose: Nose normal.   Neck:      Thyroid: No thyroid mass.   Cardiovascular:      Rate and Rhythm: Normal rate and regular rhythm.      Heart sounds: Normal heart sounds.   Pulmonary:      Effort: Pulmonary effort is normal.      Breath sounds: Normal breath sounds.   Abdominal:      General: Abdomen is flat.      Palpations: Abdomen is soft.      Comments: Diastasis: 2 cm   Musculoskeletal:         General: Normal range of motion.      Cervical back: Normal range of motion and neck supple.   Neurological:      Mental Status: She is alert and oriented to person, place, and time.   Skin:     General: Skin is warm and dry.   Psychiatric:         Mood and Affect: Mood normal.         Behavior: Behavior is cooperative.   Vitals reviewed.     Assessment:  49 y.o. presents for postpartum visit.  Problem List Items Addressed This Visit    None  Visit Diagnoses         Postpartum follow-up    -  Primary      Screening for malignant neoplasm of cervix          Encounter for screening mammogram for breast cancer        Relevant Orders    BI SCREENING MAMMOGRAM BILATERAL(TOMOSYNTHESIS)      Overactive bladder        Relevant Orders    Ambulatory referral to Urogynecology           Plan:  1) Pap: Edu on guidelines. Deferred per guidelines.  2) EPDS/Mood: 0. Patient is coping well postpartum.  3) BCM: declines. H/o IVF pregnancy. Reports being told she has no eggs.  Encouraged lubrication with sex.   4) Discussed exercises/pelvic floor PT for closure of diastasis and Kegel strengthening.  5) Prenatal vitamins recommended while breastfeeding. Clogged milk ducts and mastitis reviewed.  6) Mammogram: Aware of recommendations. Ordered. Unsure if will do while breastfeeding.  7) Overactive bladder:  Referral sent for URO/GYN.  Planning to hold off on medication for now re breastfeeding.  8) Return in about 1 year (around 6/30/2026) for Annual GYN.    Ade Perry CNM

## 2025-07-02 PROBLEM — O32.9XX0 MALPRESENTATION BEFORE ONSET OF LABOR: Status: RESOLVED | Noted: 2025-05-16 | Resolved: 2025-07-02

## 2025-07-02 PROBLEM — O09.90 HIGH RISK PREGNANCY, ANTEPARTUM: Status: RESOLVED | Noted: 2025-01-22 | Resolved: 2025-07-02

## 2025-07-02 PROBLEM — Z86.79 HISTORY OF POSTPARTUM PRE-ECLAMPSIA: Status: ACTIVE | Noted: 2025-07-02

## 2025-07-02 PROBLEM — Z87.59 HISTORY OF POSTPARTUM PRE-ECLAMPSIA: Status: ACTIVE | Noted: 2025-07-02

## 2025-07-02 PROBLEM — O09.529 MULTIGRAVIDA OF ADVANCED MATERNAL AGE: Status: ACTIVE | Noted: 2024-11-21

## 2025-07-02 PROBLEM — O10.019 BENIGN ESSENTIAL HYPERTENSION IN OBSTETRIC CONTEXT: Status: ACTIVE | Noted: 2025-07-02

## 2025-07-02 PROBLEM — O09.521 SUPERVISION OF ELDERLY MULTIGRAVIDA, FIRST TRIMESTER: Status: RESOLVED | Noted: 2024-11-21 | Resolved: 2025-07-02

## 2025-07-02 PROBLEM — O09.529 MULTIGRAVIDA OF ADVANCED MATERNAL AGE: Status: RESOLVED | Noted: 2024-11-21 | Resolved: 2025-07-02

## 2025-07-02 PROBLEM — O09.521 SUPERVISION OF ELDERLY MULTIGRAVIDA, FIRST TRIMESTER: Status: ACTIVE | Noted: 2024-11-21

## 2025-08-11 ENCOUNTER — OFFICE VISIT (OUTPATIENT)
Dept: CARDIOLOGY | Facility: CLINIC | Age: 50
End: 2025-08-11
Payer: COMMERCIAL

## 2025-08-11 VITALS
HEIGHT: 61 IN | RESPIRATION RATE: 16 BRPM | OXYGEN SATURATION: 99 % | DIASTOLIC BLOOD PRESSURE: 88 MMHG | BODY MASS INDEX: 36.63 KG/M2 | WEIGHT: 194 LBS | SYSTOLIC BLOOD PRESSURE: 138 MMHG | HEART RATE: 93 BPM

## 2025-08-11 DIAGNOSIS — Z86.32 HISTORY OF GESTATIONAL DIABETES: ICD-10-CM

## 2025-08-11 PROCEDURE — 3075F SYST BP GE 130 - 139MM HG: CPT | Performed by: STUDENT IN AN ORGANIZED HEALTH CARE EDUCATION/TRAINING PROGRAM

## 2025-08-11 PROCEDURE — 99213 OFFICE O/P EST LOW 20 MIN: CPT | Performed by: STUDENT IN AN ORGANIZED HEALTH CARE EDUCATION/TRAINING PROGRAM

## 2025-08-11 PROCEDURE — 3008F BODY MASS INDEX DOCD: CPT | Performed by: STUDENT IN AN ORGANIZED HEALTH CARE EDUCATION/TRAINING PROGRAM

## 2025-08-11 PROCEDURE — 3079F DIAST BP 80-89 MM HG: CPT | Performed by: STUDENT IN AN ORGANIZED HEALTH CARE EDUCATION/TRAINING PROGRAM

## 2025-08-11 RX ORDER — LABETALOL 200 MG/1
200 TABLET, FILM COATED ORAL 2 TIMES DAILY
Qty: 180 TABLET | Refills: 1 | Status: SHIPPED | OUTPATIENT
Start: 2025-08-11 | End: 2026-02-07

## (undated) DEVICE — SUTURE POLYSORB 0 VIOLET 1X36 GS-25

## (undated) DEVICE — SUTURE BIOSYN 4-0 UNDYED 1X18 P-12

## (undated) DEVICE — DRESSING ANTIMICROBIAL FOAM OPTIFOAM POST OP ADHESIVE 4X10 I

## (undated) DEVICE — SPONGE LAP 18X18 SAFE-T RFID ENHANCED XRAY

## (undated) DEVICE — BULB SYRINGE IRRIGATION STERILE

## (undated) DEVICE — APPLICATOR CHLORAPREP 26ML ORANGE TINT

## (undated) DEVICE — STERILE BLOOD COLLECTION TUBES

## (undated) DEVICE — BETADINE SOLUTION 8OZ BT

## (undated) DEVICE — CONTAINER SPECIMEN STERILE 5OZ

## (undated) DEVICE — GOWN SIRUS FABRNF RAGLAN XL ST 28/CS

## (undated) DEVICE — SUTURE POLYSORB 3-0 UNDYED 1X30 SC-2

## (undated) DEVICE — PACK RFID MLH DELIVERY STANDARDIZED

## (undated) DEVICE — SUTURE POLYSORB 0 UNDYED 1X30 GS-21

## (undated) DEVICE — STERISTRIP 1/2INX4IN

## (undated) DEVICE — SUTURE CHROMIC 2-0 GUT UNDYED 1X30 GS-21

## (undated) DEVICE — SUTURE PLAIN GUT 2-0, UNDYED 30" GS-25

## (undated) DEVICE — PAD GROUND ELECTROSURGICAL W/CORD

## (undated) DEVICE — TUBING SMOKE EVAC PENCIL COATED